# Patient Record
Sex: FEMALE | Race: OTHER | Employment: UNEMPLOYED | URBAN - METROPOLITAN AREA
[De-identification: names, ages, dates, MRNs, and addresses within clinical notes are randomized per-mention and may not be internally consistent; named-entity substitution may affect disease eponyms.]

---

## 2021-10-09 ENCOUNTER — HOSPITAL ENCOUNTER (OUTPATIENT)
Age: 26
Setting detail: OBSERVATION
Discharge: HOME | End: 2021-10-10
Attending: OBSTETRICS & GYNECOLOGY | Admitting: OBSTETRICS & GYNECOLOGY
Payer: COMMERCIAL

## 2021-10-09 LAB
ABO/RH: NORMAL
ALBUMIN SERPL-MCNC: 3.4 G/DL (ref 3.5–5.2)
ALP BLD-CCNC: 96 U/L (ref 35–104)
ALT SERPL-CCNC: 9 U/L (ref 0–32)
AMPHETAMINE SCREEN, URINE: NOT DETECTED
ANION GAP SERPL CALCULATED.3IONS-SCNC: 12 MMOL/L (ref 7–16)
ANTIBODY SCREEN: NORMAL
AST SERPL-CCNC: 12 U/L (ref 0–31)
BARBITURATE SCREEN URINE: NOT DETECTED
BENZODIAZEPINE SCREEN, URINE: NOT DETECTED
BILIRUB SERPL-MCNC: <0.2 MG/DL (ref 0–1.2)
BILIRUBIN URINE: NEGATIVE
BLOOD, URINE: NEGATIVE
BUN BLDV-MCNC: 9 MG/DL (ref 6–20)
CALCIUM SERPL-MCNC: 9.1 MG/DL (ref 8.6–10.2)
CANNABINOID SCREEN URINE: POSITIVE
CHLORIDE BLD-SCNC: 102 MMOL/L (ref 98–107)
CLARITY: CLEAR
CO2: 21 MMOL/L (ref 22–29)
COCAINE METABOLITE SCREEN URINE: NOT DETECTED
COLOR: YELLOW
CREAT SERPL-MCNC: 0.5 MG/DL (ref 0.5–1)
FENTANYL SCREEN, URINE: NOT DETECTED
GFR AFRICAN AMERICAN: >60
GFR NON-AFRICAN AMERICAN: >60 ML/MIN/1.73
GLUCOSE BLD-MCNC: 91 MG/DL (ref 74–99)
GLUCOSE URINE: NEGATIVE MG/DL
HCT VFR BLD CALC: 34.6 % (ref 34–48)
HEMOGLOBIN: 11.4 G/DL (ref 11.5–15.5)
KETONES, URINE: 15 MG/DL
LEUKOCYTE ESTERASE, URINE: NEGATIVE
Lab: ABNORMAL
MCH RBC QN AUTO: 29.6 PG (ref 26–35)
MCHC RBC AUTO-ENTMCNC: 32.9 % (ref 32–34.5)
MCV RBC AUTO: 89.9 FL (ref 80–99.9)
METHADONE SCREEN, URINE: NOT DETECTED
NITRITE, URINE: NEGATIVE
OPIATE SCREEN URINE: NOT DETECTED
OXYCODONE URINE: NOT DETECTED
PDW BLD-RTO: 12.7 FL (ref 11.5–15)
PH UA: 6 (ref 5–9)
PHENCYCLIDINE SCREEN URINE: NOT DETECTED
PLATELET # BLD: 325 E9/L (ref 130–450)
PMV BLD AUTO: 9.5 FL (ref 7–12)
POTASSIUM SERPL-SCNC: 3.5 MMOL/L (ref 3.5–5)
PROTEIN UA: NEGATIVE MG/DL
RBC # BLD: 3.85 E12/L (ref 3.5–5.5)
SODIUM BLD-SCNC: 135 MMOL/L (ref 132–146)
SPECIFIC GRAVITY UA: 1.02 (ref 1–1.03)
TOTAL PROTEIN: 7.2 G/DL (ref 6.4–8.3)
UROBILINOGEN, URINE: 0.2 E.U./DL
WBC # BLD: 18.3 E9/L (ref 4.5–11.5)

## 2021-10-09 PROCEDURE — 85027 COMPLETE CBC AUTOMATED: CPT

## 2021-10-09 PROCEDURE — G0378 HOSPITAL OBSERVATION PER HR: HCPCS

## 2021-10-09 PROCEDURE — 81003 URINALYSIS AUTO W/O SCOPE: CPT

## 2021-10-09 PROCEDURE — G0480 DRUG TEST DEF 1-7 CLASSES: HCPCS

## 2021-10-09 PROCEDURE — 99219 PR INITIAL OBSERVATION CARE/DAY 50 MINUTES: CPT | Performed by: OBSTETRICS & GYNECOLOGY

## 2021-10-09 PROCEDURE — 86900 BLOOD TYPING SEROLOGIC ABO: CPT

## 2021-10-09 PROCEDURE — 2580000003 HC RX 258: Performed by: OBSTETRICS & GYNECOLOGY

## 2021-10-09 PROCEDURE — 86901 BLOOD TYPING SEROLOGIC RH(D): CPT

## 2021-10-09 PROCEDURE — 80307 DRUG TEST PRSMV CHEM ANLYZR: CPT

## 2021-10-09 PROCEDURE — 80053 COMPREHEN METABOLIC PANEL: CPT

## 2021-10-09 PROCEDURE — 36415 COLL VENOUS BLD VENIPUNCTURE: CPT

## 2021-10-09 PROCEDURE — 86850 RBC ANTIBODY SCREEN: CPT

## 2021-10-09 RX ORDER — SODIUM CHLORIDE, SODIUM LACTATE, POTASSIUM CHLORIDE, CALCIUM CHLORIDE 600; 310; 30; 20 MG/100ML; MG/100ML; MG/100ML; MG/100ML
INJECTION, SOLUTION INTRAVENOUS CONTINUOUS
Status: DISCONTINUED | OUTPATIENT
Start: 2021-10-09 | End: 2021-10-10 | Stop reason: HOSPADM

## 2021-10-09 RX ORDER — SODIUM CHLORIDE, SODIUM LACTATE, POTASSIUM CHLORIDE, AND CALCIUM CHLORIDE .6; .31; .03; .02 G/100ML; G/100ML; G/100ML; G/100ML
500 INJECTION, SOLUTION INTRAVENOUS ONCE
Status: COMPLETED | OUTPATIENT
Start: 2021-10-09 | End: 2021-10-09

## 2021-10-09 RX ORDER — ONDANSETRON 2 MG/ML
4 INJECTION INTRAMUSCULAR; INTRAVENOUS EVERY 6 HOURS PRN
Status: DISCONTINUED | OUTPATIENT
Start: 2021-10-09 | End: 2021-10-10 | Stop reason: HOSPADM

## 2021-10-09 RX ORDER — ONDANSETRON 4 MG/1
4 TABLET, FILM COATED ORAL EVERY 8 HOURS PRN
COMMUNITY
End: 2021-10-21

## 2021-10-09 RX ADMIN — SODIUM CHLORIDE, POTASSIUM CHLORIDE, SODIUM LACTATE AND CALCIUM CHLORIDE 999 ML: 600; 310; 30; 20 INJECTION, SOLUTION INTRAVENOUS at 20:51

## 2021-10-09 NOTE — PROGRESS NOTES
, patient states 24 weeks. Just moved from [facility]. C/o vomiting, diarrhea and abdominal pain. States started around 1000 this morning. States vomited once and diarrhea throughout the whole day. History of previous c/section x 3, uterine rupture in 2017, 37 week loss. Patient placed on efm, call light within reach. Information collected via  system.

## 2021-10-10 VITALS
OXYGEN SATURATION: 99 % | DIASTOLIC BLOOD PRESSURE: 59 MMHG | HEART RATE: 90 BPM | RESPIRATION RATE: 16 BRPM | SYSTOLIC BLOOD PRESSURE: 112 MMHG | TEMPERATURE: 97.7 F

## 2021-10-10 PROCEDURE — G0378 HOSPITAL OBSERVATION PER HR: HCPCS

## 2021-10-10 NOTE — H&P
Subjective:      Josue Elias is an 22 y.o. female at 25 and 2/7 weeks gestation presenting with   Chief Complaint   Patient presents with    Diarrhea    Abdominal Pain    Pregnancy Problem   Patient recently arrived from [facility] and has had no prenatal care in the states yet. She brought with her her prenatal records and prenatal labs. Review of this record was performed. History of 3 previous C-sections with uterine rupture at 38 weeks in 2017 with fetal demise. Due to the above-mentioned history, patient presented to labor and delivery for reassurance regarding the pregnancy. Denies any bleeding or fluid leak. Reports good fetal movement. Denies feeling any contractions. Fetal Movement: normal.  History reviewed. No pertinent past medical history. Review of Systems  Pertinent items are noted in HPI. Objective: There were no vitals taken for this visit. There were no vitals taken for this visit. General:   alert, appears stated age and cooperative   Cervix:   Deferred.    FHT:   140 BPM     Lab Review    CBC: No results found for: WBC, RBC, HGB, HCT, MCV, MCH, MCHC, RDW, PLT, MPV  CMP:  No results found for: NA, K, CL, CO2, BUN, CREATININE, GFRAA, AGRATIO, LABGLOM, GLUCOSE, PROT, LABALBU, CALCIUM, BILITOT, ALKPHOS, AST, ALT  U/A:  No results found for: NITRITE, COLORU, PHUR, LABCAST, WBCUA, RBCUA, MUCUS, TRICHOMONAS, YEAST, BACTERIA, CLARITYU, SPECGRAV, LEUKOCYTESUR, UROBILINOGEN, BILIRUBINUR, BLOODU, GLUCOSEU, AMORPHOUS       Assessment:     24 weeks and 2-day pregnancy  3 previous   History of uterine rupture and fetal demise at 38 weeks  Geovani pain, vomiting and diarrhea    Plan:     CBC, CMP, urinalysis  IV fluids  IV Zofran as needed  Supportive therapy    Meri Cedeno MD,MD,10/9/2021 8:27 PM

## 2021-10-10 NOTE — PROGRESS NOTES
Patient given verbal and written discharge instructions with standard labor precautions instructed to keep follow up appointment with physician. Patient verbalizes understanding states no questions or concerns. Patient ambulatory off unit with family member.

## 2021-10-10 NOTE — PROGRESS NOTES
RN at bedside. Patient states she feels good and would like to go home. Denies lof, vb, and ctx's. Good fetal movement. Patients family member at bedside as well. Will update house officer.

## 2021-10-10 NOTE — PROGRESS NOTES
RN at bedside. Patient denies lof, denies vb, denies cramping or pain. States +FM. Patient refuses zofran at this time. Pt denies nausea. Call light within reach.

## 2021-10-10 NOTE — PROGRESS NOTES
Feeling better this morning. Reports good fetal movement. No further diarrhea or abdominal pain    Blood pressure (!) 112/59, pulse 90, temperature 97.7 °F (36.5 °C), resp. rate 16, SpO2 99 %.      Assessment  24 weeks and 3-day pregnancy  3 previous   History of uterine rupture at 45 weeks with fetal demise    Plan  Discharge home  We will schedule appointment for prenatal care at [facility]

## 2021-10-10 NOTE — PROGRESS NOTES
House doctor at beside. Information gathered from patient using  system. Orders received from house doctor at this time.

## 2021-10-11 LAB
INTEGRITY CHECK, CREATININE, URINE: 255.9
INTEGRITY CHECK, OXIDANT, URINE: <40
INTEGRITY CHECK, PH, URINE: 5.6 (ref 4.5–9)
INTEGRITY CHECK, SPECIFIC GRAVITY, URINE: 1.02 (ref 1–1.03)
INTEGRITY CHECK, SPECIMEN INTEGRITY, URINE: ABNORMAL

## 2021-10-18 ENCOUNTER — INITIAL PRENATAL (OUTPATIENT)
Dept: OBGYN | Age: 26
End: 2021-10-18
Payer: COMMERCIAL

## 2021-10-18 ENCOUNTER — CLINICAL DOCUMENTATION (OUTPATIENT)
Dept: OBGYN | Age: 26
End: 2021-10-18

## 2021-10-18 VITALS — HEART RATE: 95 BPM | SYSTOLIC BLOOD PRESSURE: 112 MMHG | WEIGHT: 175 LBS | DIASTOLIC BLOOD PRESSURE: 67 MMHG

## 2021-10-18 DIAGNOSIS — Z3A.25 25 WEEKS GESTATION OF PREGNANCY: Primary | ICD-10-CM

## 2021-10-18 DIAGNOSIS — Z23 NEED FOR INFLUENZA VACCINATION: ICD-10-CM

## 2021-10-18 DIAGNOSIS — Z87.828 HISTORY OF RUPTURE OF UTERUS: ICD-10-CM

## 2021-10-18 LAB
GLUCOSE URINE, POC: NORMAL
PROTEIN UA: NEGATIVE

## 2021-10-18 PROCEDURE — 99203 OFFICE O/P NEW LOW 30 MIN: CPT | Performed by: OBSTETRICS & GYNECOLOGY

## 2021-10-18 PROCEDURE — 6360000002 HC RX W HCPCS

## 2021-10-18 PROCEDURE — 90686 IIV4 VACC NO PRSV 0.5 ML IM: CPT

## 2021-10-18 PROCEDURE — G0008 ADMIN INFLUENZA VIRUS VAC: HCPCS

## 2021-10-18 PROCEDURE — 99212 OFFICE O/P EST SF 10 MIN: CPT | Performed by: OBSTETRICS & GYNECOLOGY

## 2021-10-18 PROCEDURE — 81002 URINALYSIS NONAUTO W/O SCOPE: CPT | Performed by: OBSTETRICS & GYNECOLOGY

## 2021-10-18 RX ORDER — FAMOTIDINE 20 MG/1
20 TABLET, FILM COATED ORAL DAILY
Qty: 60 TABLET | Refills: 3 | Status: SHIPPED | OUTPATIENT
Start: 2021-10-18 | End: 2021-10-21

## 2021-10-18 NOTE — PROGRESS NOTES
SUBJECTIVE:  Pt seen with    32 y.o. Bea Mcgrath female here for routine OB appointment. Here for transfer of care from [facility]. Records reviewed. Labs, pap/cx reviewed. Rubella nonimmune. Hepatitis B, C, varicella testing ordered. 2hr cbc and blood type ordered. MFM consult placed. Pt requests genetic testing. Flu vaccine given. Pt will get second covid vaccine. Pepcid ordered for heartburn. Discussed history of uterine rupture and possible need for earlier c/s-will defer timing to mfm. F/u 4 weeks ob appt and tdap. OB History    Para Term  AB Living   4 2           SAB TAB Ectopic Molar Multiple Live Births                    # Outcome Date GA Lbr Barber/2nd Weight Sex Delivery Anes PTL Lv   4 Current            3             2 Para            1 Para                Past Medical History:   Diagnosis Date    Nausea and vomiting in pregnancy 10/10/2021        Past Surgical History:   Procedure Laterality Date     SECTION          No family history on file.      Social History     Tobacco History     Smoking Status  Never Smoker    Smokeless Tobacco Use  Never Used          Alcohol History     Alcohol Use Status  Not Currently          Drug Use     Drug Use Status  Not Currently          Sexual Activity     Sexually Active  Not Asked                  Current Outpatient Medications:     famotidine (PEPCID) 20 MG tablet, Take 1 tablet by mouth daily, Disp: 60 tablet, Rfl: 3    Prenatal Vit-Fe Fumarate-FA (PRENATAL 1+1 PO), Take by mouth (Patient not taking: Reported on 10/18/2021), Disp: , Rfl:     ondansetron (ZOFRAN) 4 MG tablet, Take 4 mg by mouth every 8 hours as needed for Nausea or Vomiting, Disp: , Rfl:      No Known Allergies     OBJECTIVE:   /67   Pulse 95   Wt 175 lb (79.4 kg)     Mother's Prenatal Vitals  BP: 112/67  Weight: 175 lb (79.4 kg)  Pulse: 95  Patient Position: Sitting  Prenatal Fetal Information  Fetal Heart Rate: 143  Movement: Present      ASSESSMENT:   1. Jay Billy is a 32 y.o. female  2.   3. 25w4d  4. Transfer of care at 25 weeks from [facility]  5. Three previous  sections  6. Uterine rupture at 38 weeks with fetal demise  7. THC abuse  8. Rubella nonimmune  Patient Active Problem List    Diagnosis Date Noted    Nausea and vomiting in pregnancy 10/10/2021    24 weeks gestation of pregnancy 10/09/2021        Diagnosis Orders   1. 25 weeks gestation of pregnancy  CBC    Glucose Tolerance, 2 Hrs    Type and Screen    Ambulatory referral to Maternal Fetal    Culture, Urine    Hepatitis B Surface Antigen    Hepatitis C Antibody    HIV Screen    Varicella Zoster Antibody, IgG    Miscellaneous sendout 3    Cystic fibrosis carrier study    Miscellaneous Sendout 1   2. History of rupture of uterus  Ambulatory referral to Maternal Fetal   3. Need for influenza vaccination  INFLUENZA, QUADV, 3 YRS AND OLDER, IM PF, PREFILL SYR OR SDV, 0.5ML (Holly Beer, PF)       PLAN:     Orders Placed This Encounter   Procedures    Culture, Urine     Standing Status:   Future     Standing Expiration Date:   10/18/2022     Order Specific Question:   Specify (ex-cath, midstream, cysto, etc)?      Answer:   midstream    INFLUENZA, QUADV, 3 YRS AND OLDER, IM PF, PREFILL SYR OR SDV, 0.5ML (AFLURIA QUADV, PF)    CBC     Standing Status:   Future     Standing Expiration Date:   10/18/2022    Glucose Tolerance, 2 Hrs     Standing Status:   Future     Standing Expiration Date:   10/18/2022    Hepatitis B Surface Antigen     Standing Status:   Future     Standing Expiration Date:   10/18/2022    Hepatitis C Antibody     Standing Status:   Future     Standing Expiration Date:   10/18/2022    HIV Screen     Standing Status:   Future     Standing Expiration Date:   10/18/2022    Varicella Zoster Antibody, IgG     Standing Status:   Future     Standing Expiration Date:   10/18/2022    Miscellaneous sendout 3     Standing Status:   Future Standing Expiration Date:   10/18/2022     Order Specific Question:   Specify Req. Test (1 Test/Order)     Answer:   Johnathan Adams Cystic fibrosis carrier study     Prenatal screen     Standing Status:   Future     Standing Expiration Date:   10/18/2022     Order Specific Question:   CF1-CF Symptom? (Y/N/Unknown)     Answer:   No     Order Specific Question:   CF3-Any Family History of CF? (Y/N)     Answer:   No     Order Specific Question:   CF Speciment? Answer:   Blood    Miscellaneous Sendout 1     Standing Status:   Future     Standing Expiration Date:   10/18/2022     Order Specific Question:   Specify Req. Test (1 Test/Order)     Answer:   spinal muscular atrophy carrier screen    Ambulatory referral to Maternal Fetal     Referral Priority:   Routine     Referral Type:   Consult for Advice and Opinion     Referral Reason:   Specialty Services Required     Requested Specialty:   Alternative Medicine     Number of Visits Requested:   1    Type and Screen     Standing Status:   Future     Standing Expiration Date:   10/18/2022      Return in about 4 weeks (around 11/15/2021) for Next prenatal visit.       Electronically signed by Dennis Neff DO on 10/18/21

## 2021-10-18 NOTE — PROGRESS NOTES
Patient is here today for new ob visit. Patient recently moved here. Records were obtained from L&D that patient brought from [place]. Flu shot was ordered and given to patient along with harmony kit and form.

## 2021-10-18 NOTE — PROGRESS NOTES
CHW introduce Patient to Centering Pregnancy .  Patient will Join [facility] 18 Session 6 on 10/28/2021 @ 9:30 AM .

## 2021-10-20 LAB — CANNABINOIDS CONF, URINE: 84 NG/ML

## 2021-10-21 ENCOUNTER — OFFICE VISIT (OUTPATIENT)
Dept: FAMILY MEDICINE CLINIC | Age: 26
End: 2021-10-21
Payer: COMMERCIAL

## 2021-10-21 VITALS
HEIGHT: 61 IN | SYSTOLIC BLOOD PRESSURE: 118 MMHG | BODY MASS INDEX: 34.17 KG/M2 | WEIGHT: 181 LBS | RESPIRATION RATE: 16 BRPM | DIASTOLIC BLOOD PRESSURE: 82 MMHG | OXYGEN SATURATION: 99 % | HEART RATE: 96 BPM | TEMPERATURE: 97.1 F

## 2021-10-21 DIAGNOSIS — O09.92 HIGH-RISK PREGNANCY IN SECOND TRIMESTER: ICD-10-CM

## 2021-10-21 DIAGNOSIS — K21.9 GASTROESOPHAGEAL REFLUX DISEASE, UNSPECIFIED WHETHER ESOPHAGITIS PRESENT: ICD-10-CM

## 2021-10-21 DIAGNOSIS — Z3A.26 26 WEEKS GESTATION OF PREGNANCY: Primary | ICD-10-CM

## 2021-10-21 PROCEDURE — G8482 FLU IMMUNIZE ORDER/ADMIN: HCPCS | Performed by: STUDENT IN AN ORGANIZED HEALTH CARE EDUCATION/TRAINING PROGRAM

## 2021-10-21 PROCEDURE — G8419 CALC BMI OUT NRM PARAM NOF/U: HCPCS | Performed by: STUDENT IN AN ORGANIZED HEALTH CARE EDUCATION/TRAINING PROGRAM

## 2021-10-21 PROCEDURE — 99203 OFFICE O/P NEW LOW 30 MIN: CPT | Performed by: STUDENT IN AN ORGANIZED HEALTH CARE EDUCATION/TRAINING PROGRAM

## 2021-10-21 PROCEDURE — G8427 DOCREV CUR MEDS BY ELIG CLIN: HCPCS | Performed by: STUDENT IN AN ORGANIZED HEALTH CARE EDUCATION/TRAINING PROGRAM

## 2021-10-21 PROCEDURE — 1036F TOBACCO NON-USER: CPT | Performed by: STUDENT IN AN ORGANIZED HEALTH CARE EDUCATION/TRAINING PROGRAM

## 2021-10-21 RX ORDER — FAMOTIDINE 20 MG/1
20 TABLET, FILM COATED ORAL DAILY
Qty: 60 TABLET | Refills: 3 | Status: ON HOLD
Start: 2021-10-21 | End: 2022-01-06 | Stop reason: HOSPADM

## 2021-10-21 RX ORDER — ONDANSETRON 4 MG/1
4 TABLET, FILM COATED ORAL EVERY 8 HOURS PRN
Qty: 30 TABLET | Refills: 0 | Status: ON HOLD
Start: 2021-10-21 | End: 2022-01-06 | Stop reason: HOSPADM

## 2021-10-21 SDOH — ECONOMIC STABILITY: FOOD INSECURITY: WITHIN THE PAST 12 MONTHS, THE FOOD YOU BOUGHT JUST DIDN'T LAST AND YOU DIDN'T HAVE MONEY TO GET MORE.: PATIENT DECLINED

## 2021-10-21 SDOH — ECONOMIC STABILITY: FOOD INSECURITY: WITHIN THE PAST 12 MONTHS, YOU WORRIED THAT YOUR FOOD WOULD RUN OUT BEFORE YOU GOT MONEY TO BUY MORE.: PATIENT DECLINED

## 2021-10-21 ASSESSMENT — ENCOUNTER SYMPTOMS
EYE REDNESS: 0
SORE THROAT: 0
NAUSEA: 0
EYE PAIN: 0
COUGH: 0
DIARRHEA: 0
CONSTIPATION: 0
SHORTNESS OF BREATH: 0
ABDOMINAL PAIN: 0
SINUS PAIN: 0
SINUS PRESSURE: 0
VOMITING: 0
RHINORRHEA: 0
BACK PAIN: 0

## 2021-10-21 ASSESSMENT — PATIENT HEALTH QUESTIONNAIRE - PHQ9
SUM OF ALL RESPONSES TO PHQ QUESTIONS 1-9: 0
DEPRESSION UNABLE TO ASSESS: FUNCTIONAL CAPACITY MOTIVATION LIMITS ACCURACY
1. LITTLE INTEREST OR PLEASURE IN DOING THINGS: 0
SUM OF ALL RESPONSES TO PHQ QUESTIONS 1-9: 0
SUM OF ALL RESPONSES TO PHQ9 QUESTIONS 1 & 2: 0
2. FEELING DOWN, DEPRESSED OR HOPELESS: 0
SUM OF ALL RESPONSES TO PHQ QUESTIONS 1-9: 0

## 2021-10-21 ASSESSMENT — SOCIAL DETERMINANTS OF HEALTH (SDOH): HOW HARD IS IT FOR YOU TO PAY FOR THE VERY BASICS LIKE FOOD, HOUSING, MEDICAL CARE, AND HEATING?: PATIENT DECLINED

## 2021-10-21 NOTE — PROGRESS NOTES
10/21/2021    Imani Garcia (:  1995) is a 32 y.o. female, here for evaluation of the following medical concerns:    HPI  Chief Complaint   Patient presents with    Follow-up     26 weeks pregnant seen in ED for abdominal pain, pain is better no complaints     Patient is a 49-year-old female here today to establish care with myself. She states that her OB told her she needed to follow with me because she is a high risk pregnant patient. However I discussed that I am a primary care doctor and not a high risk pregnancy OB/GYN. We will refer her to who she needs to go to. She has no real past medical history. She had a  in the past and had a uterine rupture. She has no known allergies. HPI:  Geovanna Griggs presents to the office for ER follow up. Patient was seen in the ER for nausea and abdominal pain. Since being discharged from the ER Imani's  symptoms have been Resolved . Any prescribed medications was not taken. Discharge instructions and any medication changes were again reviewed. She declines any symptoms at this time. All her nausea has gone away    Patient denies CP, SOB, nausea, vomiting, diarrhea, fevers chills sweats, abdominal pain, numbness tingling, dizziness, lightheadedness, back pain, ear pain, eye pain, nasal congestion, headaches, blurry vision. Patient's past medical, surgical, social and/or family history reviewed, updated in chart, and are non-contributory (unless otherwise stated). Medications and allergies also reviewed and updated in chart. We did use an  for this examination       Review of Systems   Constitutional: Negative for chills, fatigue and fever. HENT: Negative for congestion, ear pain, postnasal drip, rhinorrhea, sinus pressure, sinus pain and sore throat. Eyes: Negative for pain and redness. Respiratory: Negative for cough and shortness of breath. Cardiovascular: Negative for chest pain.    Gastrointestinal: Negative for abdominal pain, constipation, diarrhea, nausea and vomiting. Genitourinary: Negative for difficulty urinating and dysuria. Musculoskeletal: Negative for back pain, myalgias and neck pain. Skin: Negative for rash. Neurological: Negative for dizziness, light-headedness and numbness. Psychiatric/Behavioral: The patient is not nervous/anxious. Prior to Visit Medications    Medication Sig Taking? Authorizing Provider   ondansetron (ZOFRAN) 4 MG tablet Take 1 tablet by mouth every 8 hours as needed for Nausea or Vomiting Yes Zainab Perkins DO   famotidine (PEPCID) 20 MG tablet Take 1 tablet by mouth daily Yes Zainab Perkins DO        No Known Allergies    Past Medical History:   Diagnosis Date    Nausea and vomiting in pregnancy 10/10/2021       Past Surgical History:   Procedure Laterality Date     SECTION         Social History     Socioeconomic History    Marital status: Single     Spouse name: Not on file    Number of children: Not on file    Years of education: Not on file    Highest education level: Not on file   Occupational History    Not on file   Tobacco Use    Smoking status: Never Smoker    Smokeless tobacco: Never Used   Vaping Use    Vaping Use: Never used   Substance and Sexual Activity    Alcohol use: Not Currently    Drug use: Not Currently    Sexual activity: Not on file   Other Topics Concern    Not on file   Social History Narrative    Not on file     Social Determinants of Health     Financial Resource Strain: Unknown    Difficulty of Paying Living Expenses: Patient refused   Food Insecurity: Unknown    Worried About Running Out of Food in the Last Year: Patient refused    Ran Out of Food in the Last Year: Patient refused   Transportation Needs:     Lack of Transportation (Medical):      Lack of Transportation (Non-Medical):    Physical Activity:     Days of Exercise per Week:     Minutes of Exercise per Session:    Stress:     Feeling of Stress :    Social Connections:     Frequency of Communication with Friends and Family:     Frequency of Social Gatherings with Friends and Family:     Attends Holiness Services:     Active Member of Clubs or Organizations:     Attends Club or Organization Meetings:     Marital Status:    Intimate Partner Violence:     Fear of Current or Ex-Partner:     Emotionally Abused:     Physically Abused:     Sexually Abused:         History reviewed. No pertinent family history. Vitals:    10/21/21 1010   BP: 118/82   Pulse: 96   Resp: 16   Temp: 97.1 °F (36.2 °C)   SpO2: 99%   Weight: 181 lb (82.1 kg)   Height: 5' 1\" (1.549 m)     Estimated body mass index is 34.2 kg/m² as calculated from the following:    Height as of this encounter: 5' 1\" (1.549 m). Weight as of this encounter: 181 lb (82.1 kg).     Most Recent Labs  CBC  Lab Results   Component Value Date    WBC 18.3 10/09/2021    RBC 3.85 10/09/2021    HGB 11.4 10/09/2021    HCT 34.6 10/09/2021    MCV 89.9 10/09/2021     10/09/2021      CMP  Lab Results   Component Value Date     10/09/2021    K 3.5 10/09/2021     10/09/2021    CO2 21 10/09/2021    ANIONGAP 12 10/09/2021    GLUCOSE 91 10/09/2021    BUN 9 10/09/2021    CREATININE 0.5 10/09/2021    LABGLOM >60 10/09/2021    GFRAA >60 10/09/2021    CALCIUM 9.1 10/09/2021    PROT 7.2 10/09/2021    LABALBU 3.4 10/09/2021    BILITOT <0.2 10/09/2021    ALKPHOS 96 10/09/2021    AST 12 10/09/2021    ALT 9 10/09/2021     UA  Lab Results   Component Value Date    COLORU Yellow 10/09/2021    CLARITYU Clear 10/09/2021    GLUCOSEU neg 10/18/2021    GLUCOSEU Negative 10/09/2021    BILIRUBINUR Negative 10/09/2021    KETUA 15 10/09/2021    SPECGRAV 1.025 10/09/2021    BLOODU Negative 10/09/2021    PHUR 6.0 10/09/2021    PROTEINU Negative 10/18/2021    PROTEINU Negative 10/09/2021    UROBILINOGEN 0.2 10/09/2021    NITRU Negative 10/09/2021    LEUKOCYTESUR Negative 10/09/2021       Physical Exam  Vitals and nursing note reviewed. Constitutional:       Appearance: Normal appearance. Comments: Setswana speaking   HENT:      Head: Normocephalic and atraumatic. Right Ear: External ear normal.      Left Ear: External ear normal.      Mouth/Throat:      Mouth: Mucous membranes are moist.      Pharynx: Oropharynx is clear. Eyes:      Extraocular Movements: Extraocular movements intact. Conjunctiva/sclera: Conjunctivae normal.      Pupils: Pupils are equal, round, and reactive to light. Cardiovascular:      Rate and Rhythm: Normal rate and regular rhythm. Pulses: Normal pulses. Heart sounds: Normal heart sounds. Pulmonary:      Effort: Pulmonary effort is normal.      Breath sounds: Normal breath sounds. Abdominal:      General: Bowel sounds are normal.      Palpations: Abdomen is soft. Comments: Patient is pregnant   Musculoskeletal:         General: Normal range of motion. Cervical back: Normal range of motion and neck supple. Skin:     General: Skin is warm and dry. Capillary Refill: Capillary refill takes less than 2 seconds. Neurological:      General: No focal deficit present. Mental Status: She is alert and oriented to person, place, and time. Psychiatric:         Mood and Affect: Mood normal.         Behavior: Behavior normal.         Thought Content: Thought content normal.         ASSESSMENT/PLAN:  Chandrika Ortiz was seen today for follow-up. Diagnoses and all orders for this visit:    26 weeks gestation of pregnancy  -     ondansetron (ZOFRAN) 4 MG tablet; Take 1 tablet by mouth every 8 hours as needed for Nausea or Vomiting    Gastroesophageal reflux disease, unspecified whether esophagitis present  -     famotidine (PEPCID) 20 MG tablet;  Take 1 tablet by mouth daily    High-risk pregnancy in second trimester  -     External Referral To OB/GYN           Educational materials and/or home exercises printed for patient's review and were included in patient instructions on his/her After Visit Summary and given to patient at the end of visit. Counseled regarding above diagnosis, including possible risks and complications,  especially if left uncontrolled. Counseled regarding the possible side effects, risks, benefits and alternatives to treatment; patient and/or guardian verbalizes understanding, agrees, feels comfortable with and wishes to proceed with above treatment plan. Advised patient to call with any new medication issues, and read all Rx info from pharmacy to assure aware of all possible risks and side effects of medication before taking. Reviewed age and gender appropriate health screening exams and vaccinations. Advised patient regarding importance of keeping up with recommended health maintenance and to schedule as soon as possible if overdue, as this is important in assessing for undiagnosed pathology, especially cancer, as well as protecting against potentially harmful/life threatening disease. Patient and/or guardian verbalizes understanding and agrees with above counseling, assessment and plan. All questions answered. Return in about 3 months (around 1/21/2022). An  electronic signature was used to authenticate this note.     --Vipin Pimentel DO on 10/21/2021 at 10:38 AM

## 2021-10-26 ENCOUNTER — ANCILLARY PROCEDURE (OUTPATIENT)
Dept: OBGYN CLINIC | Age: 26
End: 2021-10-26
Payer: COMMERCIAL

## 2021-10-26 ENCOUNTER — INITIAL PRENATAL (OUTPATIENT)
Dept: OBGYN CLINIC | Age: 26
End: 2021-10-26
Payer: COMMERCIAL

## 2021-10-26 VITALS
SYSTOLIC BLOOD PRESSURE: 105 MMHG | DIASTOLIC BLOOD PRESSURE: 71 MMHG | HEART RATE: 99 BPM | WEIGHT: 178 LBS | BODY MASS INDEX: 33.63 KG/M2

## 2021-10-26 DIAGNOSIS — Z3A.26 26 WEEKS GESTATION OF PREGNANCY: Primary | ICD-10-CM

## 2021-10-26 LAB
GLUCOSE URINE, POC: NEGATIVE
PROTEIN UA: NEGATIVE

## 2021-10-26 PROCEDURE — G8427 DOCREV CUR MEDS BY ELIG CLIN: HCPCS | Performed by: OBSTETRICS & GYNECOLOGY

## 2021-10-26 PROCEDURE — G8482 FLU IMMUNIZE ORDER/ADMIN: HCPCS | Performed by: OBSTETRICS & GYNECOLOGY

## 2021-10-26 PROCEDURE — 81002 URINALYSIS NONAUTO W/O SCOPE: CPT | Performed by: OBSTETRICS & GYNECOLOGY

## 2021-10-26 PROCEDURE — G8419 CALC BMI OUT NRM PARAM NOF/U: HCPCS | Performed by: OBSTETRICS & GYNECOLOGY

## 2021-10-26 PROCEDURE — 99203 OFFICE O/P NEW LOW 30 MIN: CPT | Performed by: OBSTETRICS & GYNECOLOGY

## 2021-10-26 PROCEDURE — 99202 OFFICE O/P NEW SF 15 MIN: CPT | Performed by: OBSTETRICS & GYNECOLOGY

## 2021-10-26 PROCEDURE — 76811 OB US DETAILED SNGL FETUS: CPT | Performed by: OBSTETRICS & GYNECOLOGY

## 2021-10-26 PROCEDURE — 1036F TOBACCO NON-USER: CPT | Performed by: OBSTETRICS & GYNECOLOGY

## 2021-10-26 NOTE — PATIENT INSTRUCTIONS
Patient Education        Quillian Speller a hardy médico yusuf el [address] (después de 20 semanas)  Learning About When to Call Your Doctor During Pregnancy (After 20 Weeks)  Instrucciones de cuidado  Es normal que tenga inquietudes acerca de lo que podría ser un problema yusuf el [address]. Aunque la mayoría de las mujeres embarazadas no tienen ningún problema grave, es importante saber cuándo llamar a hardy médico si tiene determinados síntomas o señales de trabajo de [place]. Estas son algunas sugerencias generales. Hardy médico puede darle más información sobre cuándo llamar. Cuándo llamar a hardy médico (después de 20 semanas)  Llame al 911  en cualquier momento que considere que necesita atención de [place]. Por ejemplo, llame si:  · Tiene sangrado vaginal intenso. · Tiene dolor repentino e intenso en el abdomen. · Se desmayó (perdió el conocimiento). · Tiene oswaldo convulsión. · Ve o siente el cordón umbilical.  · Cherry que está a punto de coty a conchis a hardy bebé y no puede llegar en forma gandara al hospital.  Vishal Eaves a hardy médico ahora mismo o busque atención médica inmediata si:  · Tiene sangrado vaginal.  · Tiene dolor en el abdomen. · Tiene fiebre. · Tiene síntomas de preeclampsia, jose francisco:  ? Hinchazón repentina de la emil, las yulissa o los pies. ? Nuevos problemas de visión (jose francisco oscurecimiento, dianna borroso o dianna puntos). ? Dolor de luan intenso. · Tiene oswaldo pérdida repentina de líquido por la vagina. (Piensa que rompió la jerald). · Piensa que puede luther comenzado el [place] de [place]. [place] significa que ha tenido al menos 6 contracciones en [employer]. · Nota que hardy bebé ha dejado de moverse o lo hace mucho menos de lo habitual.  · Tiene síntomas de oswaldo infección urinaria. Estos pueden incluir:  ? Dolor o ardor al orinar. ? Necesidad de orinar con frecuencia sin poder eliminar mucha orina.   ? Dolor en el flanco, que se encuentra felipe debajo de la caja torácica y [place] de la cintura en un lado de la espalda. ? [employer]. Preste especial atención a los cambios en hardy alberto y asegúrese de comunicarse con hardy médico si:  · Tiene flujo vaginal con un olor desagradable. · Tiene cambios en la piel, tales jose francisco:  ? Salpullido. ? Comezón. ? Color amarillento en la piel. · Tiene otras inquietudes acerca de hardy embarazo. Si tiene signos de trabajo de parto al llegar a las [address] o más  Si tiene señales de [place] de parto a las 37 semanas o [place], es posible que hardy médico le diga que llame cuando hardy trabajo de parto se vuelva más [place]. Los síntomas del trabajo de parto activo incluyen:  · Contracciones que son regulares. · Contracciones a intervalos de menos de 5 minutos. · Contracciones yusuf las cuales es difícil hablar. La atención de seguimiento es oswaldo parte clave de hardy tratamiento y seguridad. Asegúrese de hacer y acudir a todas las citas, y llame a hardy médico si está teniendo problemas. También es oswaldo buena idea saber los resultados de azucena exámenes y mantener oswaldo lista de los medicamentos que emilio. ¿Dónde puede encontrar más información en inglés? Briana Ricardo a [email or web address] e ingrese a hardy cuenta de MyChart. Khalif Borjao S565 en el Lizette Barth \"Search Health Information\" para más información (en inglés) sobre \"Aprenda cuándo llamar a hardy médico yusuf el embarazo (después de 20 semanas). \"     Si no tiene oswaldo cuenta, ten heidy en el enlace \"Sign Up Now\". Revisado: 16 junio, 2021               Versión del contenido: 13.0  © 2368-4445 Healthwise, Incorporated. Las instrucciones de cuidado fueron adaptadas bajo licencia por [facility] ([facility]). Si usted tiene [place] afección médica o sobre estas instrucciones, siempre pregunte a hardy profesional de alberto. Healthwise, Incorporated niega toda garantía o responsabilidad por hardy uso de esta información.          Patient Education        Ginette Bending a hardy médico yusuf el embarazo (después de 20 semanas)  Learning About When to Call Your Doctor During Pregnancy (After 20 Weeks)  Instrucciones de cuidado  Es normal que tenga inquietudes acerca de lo que podría ser un problema yusuf el [place]. Aunque la mayoría de las mujeres embarazadas no tienen ningún problema grave, es importante saber cuándo llamar a hardy médico si tiene determinados síntomas o señales de trabajo de [place]. Estas son algunas sugerencias generales. Hardy médico puede darle más información sobre cuándo llamar. Cuándo llamar a hardy médico (después de 20 semanas)  Llame al 911  en cualquier momento que considere que necesita atención de [place]. Por ejemplo, llame si:  · Tiene sangrado vaginal intenso. · Tiene dolor repentino e intenso en el abdomen. · Se desmayó (perdió el conocimiento). · Tiene oswaldo convulsión. · Ve o siente el cordón umbilical.  · Cherry que está a punto de coty a conchis a hardy bebé y no puede llegar en forma gandara al hospital.  Jennifern Mario a hardy médico ahora mismo o busque atención médica inmediata si:  · Tiene sangrado vaginal.  · Tiene dolor en el abdomen. · Tiene fiebre. · Tiene síntomas de preeclampsia, jose francisco:  ? Hinchazón repentina de la emil, las yulissa o los pies. ? Nuevos problemas de visión (jose francisco oscurecimiento, dianna borroso o dianna puntos). ? Dolor de luan intenso. · Tiene oswaldo pérdida repentina de líquido por la vagina. (Piensa que rompió la jerald). · Piensa que puede luther comenzado el [place] de [place]. [place] significa que ha tenido al menos 6 contracciones en [employer]. · Nota que hardy bebé ha dejado de moverse o lo hace mucho menos de lo habitual.  · Tiene síntomas de oswaldo infección urinaria. Estos pueden incluir:  ? Dolor o ardor al orinar. ? Necesidad de orinar con frecuencia sin poder eliminar mucha orina. ? Dolor en el flanco, que se encuentra felipe debajo de la caja torácica y [place] de la cintura en un lado de la espalda. ? [employer].   Preste especial atención a los cambios en hardy alberto y asegúrese de comunicarse con hardy médico si:  · Tiene flujo vaginal con un olor desagradable. · Tiene cambios en la piel, tales jose francisco:  ? Salpullido. ? Comezón. ? Color amarillento en la piel. · Tiene otras inquietudes acerca de hardy embarazo. Si tiene signos de trabajo de parto al llegar a las 37 [address] [facility] o más  Si tiene señales de [place] de parto a las 37 semanas o [facility], es posible que hardy médico le diga que llame cuando hardy trabajo de parto se vuelva más [place]. Los síntomas del trabajo de parto activo incluyen:  · Contracciones que son regulares. · Contracciones a intervalos de menos de 5 minutos. · Contracciones yusuf las cuales es difícil hablar. La atención de seguimiento es oswaldo parte clave de hardy tratamiento y seguridad. Asegúrese de hacer y acudir a todas las citas, y llame a hardy médico si está teniendo problemas. También es oswaldo buena idea saber los resultados de azucena exámenes y mantener oswaldo lista de los medicamentos que emilio. ¿Dónde puede encontrar más información en inglés? Priyanka Parent a [email or web address] e ingrese a hardy cuenta de Christin Solorzano O564 en el Amarilis Mukherjee \"Search Health Information\" para más información (en inglés) sobre \"Aprenda cuándo llamar a hardy médico yusuf el embarazo (después de 20 semanas). \"     Si no tiene oswaldo cuenta, ten heidy en el enlace \"Sign Up Now\". Revisado: 16 junio, 2021               Versión del contenido: 13.0  © 8217-4650 Healthwise, Incorporated. Las instrucciones de cuidado fueron adaptadas bajo licencia por [facility] ([facility]). Si usted tiene [place] afección médica o sobre estas instrucciones, siempre pregunte a hardy profesional de alberto. [facility] niega toda garantía o responsabilidad por hardy uso de esta información.          Patient Education        Coleen Schultz a hardy Christia Girt (después de 20 semanas)  Learning About When to Call Your Doctor During Pregnancy (After 20 Weeks)  Instrucciones de cuidado  Es normal que tenga inquietudes acerca de lo que podría ser un problema yusuf [place]. Aunque la mayoría de las mujeres embarazadas no tienen ningún problema grave, es importante saber cuándo llamar a hardy médico si tiene determinados síntomas o señales de trabajo de [place]. Estas son algunas sugerencias generales. Hardy médico puede darle más información sobre cuándo llamar. Cuándo llamar a hardy médico (después de 20 semanas)  Llame al 911  en cualquier momento que considere que necesita atención de [place]. Por ejemplo, llame si:  · Tiene sangrado vaginal intenso. · Tiene dolor repentino e intenso en el abdomen. · Se desmayó (perdió el conocimiento). · Tiene oswaldo convulsión. · Ve o siente el cordón umbilical.  · Cherry que está a punto de coty a conchis a hardy bebé y no puede llegar en forma gandara al hospital.  Kathi La Plata a hardy médico ahora mismo o busque atención médica inmediata si:  · Tiene sangrado vaginal.  · Tiene dolor en el abdomen. · Tiene fiebre. · Tiene síntomas de preeclampsia, jose francisco:  ? Hinchazón repentina de la emil, las yulissa o los pies. ? Nuevos problemas de visión (jose francisco oscurecimiento, dianna borroso o dianna puntos). ? Dolor de luan intenso. · Tiene oswaldo pérdida repentina de líquido por la vagina. (Piensa que rompió la jerald). · Piensa que puede luther comenzado el [place] de [place]. [place] significa que ha tenido al menos 6 contracciones en [employer]. · Nota que hardy bebé ha dejado de moverse o lo hace mucho menos de lo habitual.  · Tiene síntomas de oswaldo infección urinaria. Estos pueden incluir:  ? Dolor o ardor al orinar. ? Necesidad de orinar con frecuencia sin poder eliminar mucha orina. ? Dolor en el flanco, que se encuentra felipe debajo de la caja torácica y [place] de la cintura en un lado de la espalda. ? [employer]. Preste especial atención a los cambios en hardy alberto y asegúrese de comunicarse con hardy médico si:  · Tiene flujo vaginal con un olor desagradable. · Tiene cambios en la piel, tales jose francisco:  ? Salpullido. ? Comezón.   ? Color amarillento en la piel. · Tiene otras inquietudes acerca de hardy embarazo. Si tiene signos de trabajo de parto al llegar a las 37 [address] [facility] o más  Si tiene señales de [place] de parto a las 37 semanas o [place], es posible que hardy médico le diga que llame cuando hardy trabajo de parto se vuelva más [place]. Los síntomas del trabajo de parto activo incluyen:  · Contracciones que son regulares. · Contracciones a intervalos de menos de 5 minutos. · Contracciones yusuf las cuales es difícil hablar. La atención de seguimiento es oswaldo parte clave de hardy tratamiento y seguridad. Asegúrese de hacer y acudir a todas las citas, y llame a hardy médico si está teniendo problemas. También es oswaldo buena idea saber los resultados de azucena exámenes y mantener oswaldo lista de los medicamentos que emilio. ¿Dónde puede encontrar más información en inglés? Jo Frost a [email or web address] e ingrese a hardy cuenta de MyChart. Ace Popper G938 en el Ellen Salem \"Search Health Information\" para más información (en inglés) sobre \"Aprenda cuándo llamar a hardy médico yusuf el embarazo (después de 20 semanas). \"     Si no tiene oswaldo cuenta, ten heidy en el enlace \"Sign Up Now\". Revisado: 16 junio, 2021               Versión del contenido: 13.0  © 8716-9145 Healthwise, Incorporated. Las instrucciones de cuidado fueron adaptadas bajo licencia por [facility] ([facility]). Si usted tiene [place] afección médica o sobre estas instrucciones, siempre pregunte a hardy profesional de alberto. Healthwise, Incorporated niega toda garantía o responsabilidad por hardy uso de esta información.

## 2021-10-26 NOTE — LETTER
[facility] Maternal Fetal Medicine  [address] [place] [address]  Phone: [phone]  Fax: [phone]           Eldon Rojas MD      October 28, 2021     Patient: Chevy Kapoor   MR Number: [id number]   YOB: 1995   Date of Visit: 10/26/2021       Dear Dr. Cayden Putnam: Thank you for referring Chevy Kapoor to me for evaluation/treatment. Below are the relevant portions of my assessment and plan of care. If you have questions, please do not hesitate to call me. I look forward to following Imani along with you.     Sincerely,        Eldon Rojas MD    CC providers:  Violeta Nowak DO  [address] [place] [id number]  Via In [facility] [phone]

## 2021-10-28 ENCOUNTER — ROUTINE PRENATAL (OUTPATIENT)
Dept: OBGYN | Age: 26
End: 2021-10-28
Payer: COMMERCIAL

## 2021-10-28 VITALS
DIASTOLIC BLOOD PRESSURE: 72 MMHG | BODY MASS INDEX: 33.87 KG/M2 | HEART RATE: 109 BPM | WEIGHT: 179.25 LBS | SYSTOLIC BLOOD PRESSURE: 111 MMHG

## 2021-10-28 DIAGNOSIS — Z34.93 PRENATAL CARE IN THIRD TRIMESTER: Primary | ICD-10-CM

## 2021-10-28 LAB
GLUCOSE URINE, POC: NEGATIVE
PROTEIN UA: NEGATIVE

## 2021-10-28 PROCEDURE — G8482 FLU IMMUNIZE ORDER/ADMIN: HCPCS | Performed by: MIDWIFE

## 2021-10-28 PROCEDURE — 99212 OFFICE O/P EST SF 10 MIN: CPT | Performed by: MIDWIFE

## 2021-10-28 PROCEDURE — 81002 URINALYSIS NONAUTO W/O SCOPE: CPT | Performed by: MIDWIFE

## 2021-10-28 PROCEDURE — G8427 DOCREV CUR MEDS BY ELIG CLIN: HCPCS | Performed by: MIDWIFE

## 2021-10-28 PROCEDURE — 99213 OFFICE O/P EST LOW 20 MIN: CPT | Performed by: MIDWIFE

## 2021-10-28 PROCEDURE — 1036F TOBACCO NON-USER: CPT | Performed by: MIDWIFE

## 2021-10-28 PROCEDURE — G8419 CALC BMI OUT NRM PARAM NOF/U: HCPCS | Performed by: MIDWIFE

## 2021-10-28 NOTE — PROGRESS NOTES
evaluation was done today. Please refer to the enclosed copy of the ultrasound report for further detailed information. ULTRASOUND IMPRESSION:    ? US is not diagnostic for fetal aneuploidy and may not detect all structural defects even if multiple exams are performed. ? Normal ultrasound findings cannot guarantee normal pregnancy outcomes. ? Within developmental and technical limits of ultrasound assessment,   ? Vertex Female fetus @  26w 5d   ? Active, responsive baby. The amniotic fluid volume appears normal.   ? The fetus is measuring appropriate for gestational age. - 943g ( 2:1)  48%ile   ? There has been good interval growth and development . ? Fetal anatomy was reviewed and appears normal.  ? Transabdominal views of cervix appears to be closed, long, without significant funneling upon Valsalva. ? Placenta is big, posterior/ fundal with lateral extension , Grade 1, intact with central insertion 3VC.   - No overlying vessels near leading edge. ( no previa)  - OEWN/ prior C/S scars  intact  ? Soft markers for aneuploidy are examined and found to be favorable. - ~Many of the important findings cannot be fully assessed at this gestational age  ? She noted fetal movement, no cramping or contractions . ? RECOMMENDATIONS:  ? Per ACOG Committee Opinion 853 ( 2021) Medically Indicated Late- and Early Term Deliveries  -  ? Recommend best window of opportunity for  repeat  with history of uterine rupture  is   36-36w   ? Sooner for clinical indices - bleeding, labor, fetal problems etc.  ? No   ? Precautionary steroids @ 34w / sooner PRN  1. Second  Trimester  concerns and precautions reviewed with patient. Discussed fetal movements and the role of  testing to help optimize growth and development and help predict/ avoid stress. Discussed trouble signs to watch for. 2. The patient is to continue to follow with you in your office for ongoing obstetric care.   3. Followup visit in  4 weeks . 4. MFM Appointment has  been scheduled  11/17/2021  5. I advised the patient that the [employer] of Obstetrics and Gynecology and The Society for Maternal Fetal Medicine recommend that all pregnant women be vaccinated for COVID-19. Information provided   6. Further evaluation and management will be dependent on her clinical presentation and the results of her testing. Once again, thank you for allowing us to participate in the care of this patient and if we can be of any further assistance to you, please do not hesitate to contact us. Sincerely,      Azul Cronin MD  I was present during her visit , supervised the ultrasound examination and provided the patient evaluation and management. The total time in minutes spent regarding the patient today, reviewing medical records, reviewing imaging studies, performing ultrasonic imaging, reviewing laboratory testing, and documenting information was 16 minutes, of which, 50% of the time was spent in patient education, counseling, and coordinating care with the patient, her provider, and/or her family. I answered all of her questions to her satisfaction.  I asked her to call if she had any additional questions prior to her next visit      ^^^ Intact OWEN      Placenta clear of scar, no previa

## 2021-10-28 NOTE — PROGRESS NOTES
Imani Cantor is a 32 y.o. female 30w0d  Centering Pregnancy Visit    [id number]    OB History    Para Term  AB Living   4 3 3   0 2   SAB TAB Ectopic Molar Multiple Live Births   0         2      # Outcome Date GA Lbr Barber/2nd Weight Sex Delivery Anes PTL Lv   4 Current            3 Term 2017 38w0d    CS-LTranv   FD      Birth Comments: uterine rupture, still birth    2 Term 16 39w0d  7 lb 14 oz (3.572 kg) F CS-LTranv  Y MONA      Birth Comments: meconium aspiration   1 Term 04/15/13 39w0d  8 lb (3.629 kg) M CS-LTranv   MONA      Birth Comments: meconium aspiration         Mother's Prenatal Vitals  BP: 111/72  Weight: 179 lb 4 oz (81.3 kg)  Pulse: 109  Patient Position: Sitting  Prenatal Fetal Information  Fundal Height (cm): 29 cm  Movement: Present      The patient was seen and evaluated. There was positive fetal movements. No contractions or leakage of fluid. Signs and symptoms of  labor as well as labor were reviewed. The S/S of Pre-Eclampsia were reviewed with the patient in detail. She is to report any of these if they occur. She currently denies any of these. The patient had her 28 week labs ordered. T-Dap Vaccine (27-36 weeks): today, had flu vaccine    Assessment:  1. Imani Cantor is a 32 y.o. female  2. [id number]  3. 27w0d    Patient Active Problem List    Diagnosis Date Noted    Nausea and vomiting in pregnancy 10/10/2021    24 weeks gestation of pregnancy 10/09/2021        Diagnosis Orders   1. Prenatal care in third trimester  POCT urine qual dipstick protein    POCT urine qual dipstick glucose             Plan:  The patient will return to the office for her next visit in 2 weeks.     FABIAN Zepeda CNM

## 2021-11-04 ENCOUNTER — HOSPITAL ENCOUNTER (OUTPATIENT)
Age: 26
Discharge: HOME | End: 2021-11-04
Payer: MEDICAID

## 2021-11-04 DIAGNOSIS — Z3A.25 25 WEEKS GESTATION OF PREGNANCY: ICD-10-CM

## 2021-11-04 LAB
ABO/RH: NORMAL
ANTIBODY SCREEN: NORMAL
GLUCOSE TOLERANCE TEST 1 HOUR: 151 MG/DL
GLUCOSE TOLERANCE TEST 2 HOUR: 95 MG/DL
GLUCOSE TOLERANCE TEST FASTING: 80 MG/DL
HCT VFR BLD CALC: 33.3 % (ref 34–48)
HEMOGLOBIN: 10.7 G/DL (ref 11.5–15.5)
Lab: NORMAL
MCH RBC QN AUTO: 28.8 PG (ref 26–35)
MCHC RBC AUTO-ENTMCNC: 32.1 % (ref 32–34.5)
MCV RBC AUTO: 89.8 FL (ref 80–99.9)
PDW BLD-RTO: 12.9 FL (ref 11.5–15)
PLATELET # BLD: 312 E9/L (ref 130–450)
PMV BLD AUTO: 10.2 FL (ref 7–12)
RBC # BLD: 3.71 E12/L (ref 3.5–5.5)
REPORT: NORMAL
THIS TEST SENT TO: NORMAL
WBC # BLD: 11.5 E9/L (ref 4.5–11.5)

## 2021-11-04 PROCEDURE — 86787 VARICELLA-ZOSTER ANTIBODY: CPT

## 2021-11-04 PROCEDURE — 86901 BLOOD TYPING SEROLOGIC RH(D): CPT

## 2021-11-04 PROCEDURE — 81220 CFTR GENE COM VARIANTS: CPT

## 2021-11-04 PROCEDURE — 86803 HEPATITIS C AB TEST: CPT

## 2021-11-04 PROCEDURE — 36415 COLL VENOUS BLD VENIPUNCTURE: CPT

## 2021-11-04 PROCEDURE — 87340 HEPATITIS B SURFACE AG IA: CPT

## 2021-11-04 PROCEDURE — 82951 GLUCOSE TOLERANCE TEST (GTT): CPT

## 2021-11-04 PROCEDURE — 81329 SMN1 GENE DOS/DELETION ALYS: CPT

## 2021-11-04 PROCEDURE — 86703 HIV-1/HIV-2 1 RESULT ANTBDY: CPT

## 2021-11-04 PROCEDURE — 86900 BLOOD TYPING SEROLOGIC ABO: CPT

## 2021-11-04 PROCEDURE — 85027 COMPLETE CBC AUTOMATED: CPT

## 2021-11-04 PROCEDURE — 86850 RBC ANTIBODY SCREEN: CPT

## 2021-11-05 LAB
HEPATITIS B SURFACE ANTIGEN INTERPRETATION: NORMAL
HEPATITIS C ANTIBODY INTERPRETATION: NORMAL
HIV-1 AND HIV-2 ANTIBODIES: NORMAL

## 2021-11-08 LAB — VARICELLA-ZOSTER VIRUS AB, IGG: NORMAL

## 2021-11-11 ENCOUNTER — CLINICAL DOCUMENTATION (OUTPATIENT)
Dept: OBGYN | Age: 26
End: 2021-11-11

## 2021-11-11 ENCOUNTER — ROUTINE PRENATAL (OUTPATIENT)
Dept: OBGYN | Age: 26
End: 2021-11-11
Payer: MEDICAID

## 2021-11-11 VITALS
BODY MASS INDEX: 34.05 KG/M2 | SYSTOLIC BLOOD PRESSURE: 93 MMHG | DIASTOLIC BLOOD PRESSURE: 58 MMHG | HEART RATE: 114 BPM | WEIGHT: 180.2 LBS

## 2021-11-11 DIAGNOSIS — Z34.93 PRENATAL CARE IN THIRD TRIMESTER: Primary | ICD-10-CM

## 2021-11-11 LAB
GLUCOSE URINE, POC: NEGATIVE
PROTEIN UA: NEGATIVE

## 2021-11-11 PROCEDURE — 99213 OFFICE O/P EST LOW 20 MIN: CPT | Performed by: MIDWIFE

## 2021-11-11 PROCEDURE — 1036F TOBACCO NON-USER: CPT | Performed by: MIDWIFE

## 2021-11-11 PROCEDURE — G8482 FLU IMMUNIZE ORDER/ADMIN: HCPCS | Performed by: MIDWIFE

## 2021-11-11 PROCEDURE — 81002 URINALYSIS NONAUTO W/O SCOPE: CPT | Performed by: MIDWIFE

## 2021-11-11 PROCEDURE — G8419 CALC BMI OUT NRM PARAM NOF/U: HCPCS | Performed by: MIDWIFE

## 2021-11-11 PROCEDURE — 99212 OFFICE O/P EST SF 10 MIN: CPT | Performed by: MIDWIFE

## 2021-11-11 PROCEDURE — G8428 CUR MEDS NOT DOCUMENT: HCPCS | Performed by: MIDWIFE

## 2021-11-11 NOTE — PROGRESS NOTES
Patient participated in [facility] 18 session 7  Today topic: Caring for your baby, Planning Pediatric care, and car seat safety. Patient received; Car seat, Diapers size 4 and baby wipes. Patient will contact [place] staff with any questions or concerns.

## 2021-11-11 NOTE — PROGRESS NOTES
Timmy Hernandez is a 32 y.o. female 32w0d  Centering Pregnancy visit     [id number]    OB History    Para Term  AB Living   4 3 3   0 2   SAB IAB Ectopic Molar Multiple Live Births   0         2      # Outcome Date GA Lbr Barber/2nd Weight Sex Delivery Anes PTL Lv   4 Current            3 Term 2017 38w0d    CS-LTranv   FD      Birth Comments: uterine rupture, still birth    2 Term 16 39w0d  7 lb 14 oz (3.572 kg) F CS-LTranv  Y MONA      Birth Comments: meconium aspiration   1 Term 04/15/13 39w0d  8 lb (3.629 kg) M CS-LTranv   MONA      Birth Comments: meconium aspiration         Mother's Prenatal Vitals  BP: (!) 93/58  Weight: 180 lb 3.2 oz (81.7 kg)  Pulse: 114  Patient Position: Sitting  Alb/Glu  Albumin: Negative  Glucose: Negative      The patient was seen and evaluated. There was positive fetal movements. No contractions or leakage of fluid. Signs and symptoms of  labor as well as labor were reviewed. The S/S of Pre-Eclampsia were reviewed with the patient in detail. She is to report any of these if they occur. She currently denies any of these. The patient had her 28 week labs completed. Assessment:  1. Timmy Hernandez is a 32 y.o. female  2. [id number]  3. 29w0d    Patient Active Problem List    Diagnosis Date Noted    Nausea and vomiting in pregnancy 10/10/2021    24 weeks gestation of pregnancy 10/09/2021        Diagnosis Orders   1. Prenatal care in third trimester  POCT urine qual dipstick glucose    POCT urine qual dipstick protein             Plan:  The patient will return to the office for her next visit in 2 weeks.      FABIAN Salas CNM

## 2021-11-17 ENCOUNTER — ROUTINE PRENATAL (OUTPATIENT)
Dept: OBGYN | Age: 26
End: 2021-11-17
Payer: MEDICAID

## 2021-11-17 VITALS
WEIGHT: 183.2 LBS | SYSTOLIC BLOOD PRESSURE: 99 MMHG | BODY MASS INDEX: 34.62 KG/M2 | HEART RATE: 101 BPM | DIASTOLIC BLOOD PRESSURE: 68 MMHG

## 2021-11-17 DIAGNOSIS — Z34.93 PRENATAL CARE IN THIRD TRIMESTER: Primary | ICD-10-CM

## 2021-11-17 DIAGNOSIS — Z3A.29 29 WEEKS GESTATION OF PREGNANCY: ICD-10-CM

## 2021-11-17 LAB
GLUCOSE URINE, POC: NORMAL
PROTEIN UA: NEGATIVE

## 2021-11-17 PROCEDURE — 81002 URINALYSIS NONAUTO W/O SCOPE: CPT | Performed by: OBSTETRICS & GYNECOLOGY

## 2021-11-17 PROCEDURE — 99213 OFFICE O/P EST LOW 20 MIN: CPT | Performed by: OBSTETRICS & GYNECOLOGY

## 2021-11-17 PROCEDURE — 6360000002 HC RX W HCPCS

## 2021-11-17 PROCEDURE — 90700 DTAP VACCINE < 7 YRS IM: CPT

## 2021-11-17 PROCEDURE — 90471 IMMUNIZATION ADMIN: CPT

## 2021-11-17 PROCEDURE — 99212 OFFICE O/P EST SF 10 MIN: CPT | Performed by: OBSTETRICS & GYNECOLOGY

## 2021-11-17 RX ORDER — VITAMIN A ACETATE, BETA CAROTENE, ASCORBIC ACID, CHOLECALCIFEROL, .ALPHA.-TOCOPHEROL ACETATE, DL-, THIAMINE MONONITRATE, RIBOFLAVIN, NIACINAMIDE, PYRIDOXINE HYDROCHLORIDE, FOLIC ACID, CYANOCOBALAMIN, CALCIUM CARBONATE, FERROUS FUMARATE, ZINC OXIDE, CUPRIC OXIDE 3080; 12; 120; 400; 1; 1.84; 3; 20; 22; 920; 25; 200; 27; 10; 2 [IU]/1; UG/1; MG/1; [IU]/1; MG/1; MG/1; MG/1; MG/1; MG/1; [IU]/1; MG/1; MG/1; MG/1; MG/1; MG/1
1 TABLET, FILM COATED ORAL DAILY
Qty: 90 TABLET | Refills: 3 | Status: SHIPPED | OUTPATIENT
Start: 2021-11-17 | End: 2022-02-28

## 2021-11-17 NOTE — PROGRESS NOTES
Pt is here today for prenatal visit at 29w6d. Pt needs Rx for Prenatals sent to her pharmacy. She had been using OTC prenatals but ran out. Pt states that her repeat CS is scheduled for December but no surgery encounter listed in her chart at this time, please confirm delivery date. Baby is active per pt. Denies bleeding, leaking of fluids, contractions. Plan of care discussed with pt by provider.

## 2021-11-23 ENCOUNTER — ROUTINE PRENATAL (OUTPATIENT)
Dept: OBGYN CLINIC | Age: 26
End: 2021-11-23
Payer: MEDICAID

## 2021-11-23 ENCOUNTER — ANCILLARY PROCEDURE (OUTPATIENT)
Dept: OBGYN CLINIC | Age: 26
End: 2021-11-23
Payer: MEDICAID

## 2021-11-23 VITALS
WEIGHT: 180.5 LBS | DIASTOLIC BLOOD PRESSURE: 67 MMHG | SYSTOLIC BLOOD PRESSURE: 97 MMHG | HEART RATE: 90 BPM | BODY MASS INDEX: 34.11 KG/M2

## 2021-11-23 DIAGNOSIS — Z3A.30 PREGNANCY WITH 30 COMPLETED WEEKS GESTATION: Primary | ICD-10-CM

## 2021-11-23 DIAGNOSIS — O21.9 NAUSEA AND VOMITING IN PREGNANCY: ICD-10-CM

## 2021-11-23 LAB
CYSTIC FIBROSIS 165 VARIANTS INTERP: NORMAL
CYSTIC FIBROSIS 5T VARIANT: NORMAL
CYSTIC FIBROSIS ALLELE 1: NEGATIVE
CYSTIC FIBROSIS ALLELE 2: NEGATIVE
GLUCOSE URINE, POC: NEGATIVE
PROTEIN UA: NEGATIVE

## 2021-11-23 PROCEDURE — G8419 CALC BMI OUT NRM PARAM NOF/U: HCPCS | Performed by: OBSTETRICS & GYNECOLOGY

## 2021-11-23 PROCEDURE — 81002 URINALYSIS NONAUTO W/O SCOPE: CPT | Performed by: OBSTETRICS & GYNECOLOGY

## 2021-11-23 PROCEDURE — 99213 OFFICE O/P EST LOW 20 MIN: CPT

## 2021-11-23 PROCEDURE — 76816 OB US FOLLOW-UP PER FETUS: CPT | Performed by: OBSTETRICS & GYNECOLOGY

## 2021-11-23 PROCEDURE — G8482 FLU IMMUNIZE ORDER/ADMIN: HCPCS | Performed by: OBSTETRICS & GYNECOLOGY

## 2021-11-23 PROCEDURE — 99213 OFFICE O/P EST LOW 20 MIN: CPT | Performed by: OBSTETRICS & GYNECOLOGY

## 2021-11-23 PROCEDURE — 1036F TOBACCO NON-USER: CPT | Performed by: OBSTETRICS & GYNECOLOGY

## 2021-11-23 PROCEDURE — 76819 FETAL BIOPHYS PROFIL W/O NST: CPT | Performed by: OBSTETRICS & GYNECOLOGY

## 2021-11-23 PROCEDURE — G8427 DOCREV CUR MEDS BY ELIG CLIN: HCPCS | Performed by: OBSTETRICS & GYNECOLOGY

## 2021-11-23 PROCEDURE — 99212 OFFICE O/P EST SF 10 MIN: CPT | Performed by: OBSTETRICS & GYNECOLOGY

## 2021-11-23 PROCEDURE — 76821 MIDDLE CEREBRAL ARTERY ECHO: CPT | Performed by: OBSTETRICS & GYNECOLOGY

## 2021-11-23 PROCEDURE — 76820 UMBILICAL ARTERY ECHO: CPT | Performed by: OBSTETRICS & GYNECOLOGY

## 2021-11-23 NOTE — PROGRESS NOTES
Here for pregnancy ultrasound. States good fetal movement. Denies lof, vaginal bleeding or contractions. Voiced no complaints.

## 2021-11-23 NOTE — LETTER
[facility]aternal Fetal [facility]  [address] [place] [address]  Phone: [phone]  Fax: [phone]           Ervin Ruth MD      November 29, 2021     Patient: Timmy Hernandez   MR Number: [id number]   YOB: 1995   Date of Visit: 11/23/2021       Dear Dr. Alonso Gaytan: Thank you for referring Timmy Hernandez to me for evaluation/treatment. Below are the relevant portions of my assessment and plan of care. If you have questions, please do not hesitate to call me. I look forward to following Imani along with you.     Sincerely,        Ervin Ruth MD    CC providers:  Vianey Soria, [phone]  Via In [facility]

## 2021-11-23 NOTE — PATIENT INSTRUCTIONS
Please arrive for your scheduled appointment at least 15 minutes early with your actual insurance card+ a photo ID. Also if you need any refills ordered or have questions, it may take up 48 hours to reply. Please allow ample time for your refills. Call me when you use last refill. Thank you for your cooperation. You might be having an NST at your next appt. Please eat a large snack or breakfast before coming to office. Thank youCall your primary obstetrician with bleeding, leaking of fluid, abdominal tenderness, headache, blurry vision, epigastric pain and increased urinary frequency. Any questions contact [address] [place] [address] at [phone]. If you are experiencing an emergency and need immediate help, call 911 or go to go emergency room or labor and delivery. if you are sick, not feeling well or have an infectious process going on please reschedule your appointment by calling [phone]. Also if any family members are not feeling well, please do not bring them to your appointment. We appreciate your cooperation. We are doing this in order to protect our pregnant mothers+ their babies.

## 2021-11-29 NOTE — PROGRESS NOTES
[facility]  [address] [place] [address].  Eliu Cruz [place], [place]. [address]  Ph: [phone] Fax: [phone]  2021     RE: Eric Garcia   [birth year]  Dear Dr. Justino Robles  : Thank you for sending  Ms. de Marlowe Paget    for consultation and ultrasound in our office on 2021    REASON FOR VISIT - 33yo [id number] @ 30w5d               PT speaks primarily Salvadorean. Communication via hospital  Ms Pete Walters.   ~ Prior c/s x 3 - all in [place] - no records available. She relates her last pregnancy with uterine rupture and baby loss was notably different with bleeding episodes throughout the pregnancy. This pregnancy is going well. ? Fetal Anatomy Survey z36  ? Fetal Growth and Development  o36.90x0  ? High risk pregnancy o09.89  ? Smoking o99.33  ? Fetal Exposure- drugs o35.5  - Marijuana F12  ? Late Prenatal Care o09.33  ? Obesity o99.21  ? Poor OB History o09.29   ? History of pregnancy complications W85.36  ? Prior  o34.21    - 2013 - 39w M 8#   - 2016 - 39w F 7:14   ? Prior Classical/ vertical CS  o34.212  - Rupture Uterus o71.1  -or- (?)   - Rupture C/S scar /dehiscence o71.81  (?)    Prior losses o26.2  ? ~Note - DSUA neg proteinuria, neg glucosuria today      Review of Systems :   · CONSTITUTIONAL: No fever, no chills , no undue aches, pain   · HEENT: No headache, no visual changes, no sore throat . No loss of smell, taste  · PULM: No dyspnea, no cough  · CARDIO:  No chest pains, no palpitations  · GI: No N/V, no D/C, no diarrhea, no abdominal pain   ·  : No dysuria, no vaginal bleeding or fluid leaking or discharge   · She reports good fetal movement   PHYSICAL EXAMINATION: VSS - Afebrile  BMI 34.11    BP 97/67   · General Appearance: Healthy looking, alert , no acute distress. · Eyes: No pallor, no icterus, no photophobia. · Back: No CVA tenderness. · Abdomen: Soft, non-tender.  C/S scars   · Extremities: No pretibial pitting edema scheduled  12/17/2021  5. I advised the patient that the [employer] of Obstetrics and Gynecology and The Society for Maternal Fetal Medicine recommend that all pregnant women be vaccinated for COVID-19. Information provided   6. Further evaluation and management will be dependent on her clinical presentation and the results of her testing. Once again, thank you for allowing us to participate in the care of this patient and if we can be of any further assistance to you, please do not hesitate to contact us. Sincerely,        Fady Lopez MD  I was present during her visit , supervised the ultrasound examination and provided the patient evaluation and management. The total time in minutes spent regarding the patient today, reviewing medical records, reviewing imaging studies, performing ultrasonic imaging, reviewing laboratory testing, and documenting information was 16 minutes, of which, 50% of the time was spent in patient education, counseling, and coordinating care with the patient, her provider, and/or her family. I answered all of her questions to her satisfaction.  I asked her to call if she had any additional questions prior to her next visit

## 2021-12-01 ENCOUNTER — ROUTINE PRENATAL (OUTPATIENT)
Dept: OBGYN | Age: 26
End: 2021-12-01
Payer: MEDICAID

## 2021-12-01 VITALS
DIASTOLIC BLOOD PRESSURE: 69 MMHG | WEIGHT: 184.6 LBS | HEART RATE: 108 BPM | SYSTOLIC BLOOD PRESSURE: 103 MMHG | BODY MASS INDEX: 34.88 KG/M2

## 2021-12-01 DIAGNOSIS — O21.9 NAUSEA AND VOMITING IN PREGNANCY: ICD-10-CM

## 2021-12-01 DIAGNOSIS — Z3A.31 31 WEEKS GESTATION OF PREGNANCY: Primary | ICD-10-CM

## 2021-12-01 LAB
GLUCOSE URINE, POC: NEGATIVE
INTERPRETATION: NORMAL
PROTEIN UA: NEGATIVE
SMA COPY NUMBER, LINKED VARIANT: NORMAL
SMA COPY NUMBER, SMN1 COPIES: NORMAL
SMA COPY NUMBER, SMN2 COPIES: NORMAL
SMA COPY NUMBER, SPECIMEN: NORMAL
SMA COPY NUMBER, SYMPTOMS: NORMAL

## 2021-12-01 PROCEDURE — 99212 OFFICE O/P EST SF 10 MIN: CPT | Performed by: OBSTETRICS & GYNECOLOGY

## 2021-12-01 PROCEDURE — 81002 URINALYSIS NONAUTO W/O SCOPE: CPT | Performed by: OBSTETRICS & GYNECOLOGY

## 2021-12-01 PROCEDURE — G8482 FLU IMMUNIZE ORDER/ADMIN: HCPCS | Performed by: OBSTETRICS & GYNECOLOGY

## 2021-12-01 PROCEDURE — 99213 OFFICE O/P EST LOW 20 MIN: CPT | Performed by: OBSTETRICS & GYNECOLOGY

## 2021-12-01 PROCEDURE — G8427 DOCREV CUR MEDS BY ELIG CLIN: HCPCS | Performed by: OBSTETRICS & GYNECOLOGY

## 2021-12-01 PROCEDURE — G8419 CALC BMI OUT NRM PARAM NOF/U: HCPCS | Performed by: OBSTETRICS & GYNECOLOGY

## 2021-12-01 PROCEDURE — 1036F TOBACCO NON-USER: CPT | Performed by: OBSTETRICS & GYNECOLOGY

## 2021-12-01 NOTE — PROGRESS NOTES
SUBJECTIVE:   32 y.o. [id number] female here for routine OB appointment. No complaints. Good FM. No LOF, VB, ctx. C/s scheduled. Needs steroids at 34 weeks. F/u 2 weeks. Start NSTs. OB History    Para Term  AB Living   4 3 3   0 2   SAB IAB Ectopic Molar Multiple Live Births   0         2      # Outcome Date GA Lbr Barber/2nd Weight Sex Delivery Anes PTL Lv   4 Current            3 Term 2017 38w0d    CS-LTranv   FD      Birth Comments: uterine rupture, still birth    2 Term 16 39w0d  7 lb 14 oz (3.572 kg) F CS-LTranv  Y MONA      Birth Comments: meconium aspiration   1 Term 04/15/13 39w0d  8 lb (3.629 kg) M CS-LTranv   MONA      Birth Comments: meconium aspiration       Past Medical History:   Diagnosis Date    Nausea and vomiting in pregnancy 10/10/2021        Past Surgical History:   Procedure Laterality Date     SECTION      x3        No family history on file.      Social History     Tobacco History     Smoking Status  Never Smoker    Smokeless Tobacco Use  Never Used          Alcohol History     Alcohol Use Status  Not Currently          Drug Use     Drug Use Status  Not Currently          Sexual Activity     Sexually Active  Not Asked                  Current Outpatient Medications:     Prenatal Vit-Fe Fumarate-FA (PRENATAL PLUS) 27-1 MG TABS, Take 1 tablet by mouth daily, Disp: 90 tablet, Rfl: 3    ondansetron (ZOFRAN) 4 MG tablet, Take 1 tablet by mouth every 8 hours as needed for Nausea or Vomiting, Disp: 30 tablet, Rfl: 0    famotidine (PEPCID) 20 MG tablet, Take 1 tablet by mouth daily, Disp: 60 tablet, Rfl: 3     No Known Allergies     OBJECTIVE:   /69   Pulse 108   Wt 184 lb 9.6 oz (83.7 kg)   BMI 34.88 kg/m²     Mother's Prenatal Vitals  BP: 103/69  Weight: 184 lb 9.6 oz (83.7 kg)  Pulse: 108  Patient Position: Sitting  Alb/Glu  Albumin: Negative  Glucose: Negative  Prenatal Fetal Information  Fundal Height (cm): 32 cm  Fetal Heart Rate: 135  Movement: Present      ASSESSMENT:   1. Michelle Baker is a 32 y.o. female  2. [id number]  3. 31w6d  4. Transfer of care at [facility] weeks from [facility]  5. Three previous  sections  6. Uterine rupture at 38 weeks with fetal demise-plan for repeat c/s at 36-37 weeks and steroids at 34 weeks  7. THC abuse  8. Rubella nonimmune  9. Varicella equivical  Patient Active Problem List    Diagnosis Date Noted    Nausea and vomiting in pregnancy 10/10/2021    24 weeks gestation of pregnancy 10/09/2021        Diagnosis Orders   1. 31 weeks gestation of pregnancy     2. Nausea and vomiting in pregnancy         PLAN:     No orders of the defined types were placed in this encounter. Return for Prenatal visit, continue NSTs twice weekly.       Electronically signed by Ying Gomez DO on 21

## 2021-12-06 ENCOUNTER — ANCILLARY PROCEDURE (OUTPATIENT)
Dept: OBGYN CLINIC | Age: 26
End: 2021-12-06
Payer: MEDICAID

## 2021-12-06 ENCOUNTER — ROUTINE PRENATAL (OUTPATIENT)
Dept: OBGYN CLINIC | Age: 26
End: 2021-12-06
Payer: MEDICAID

## 2021-12-06 VITALS
WEIGHT: 185 LBS | HEART RATE: 89 BPM | SYSTOLIC BLOOD PRESSURE: 99 MMHG | DIASTOLIC BLOOD PRESSURE: 68 MMHG | BODY MASS INDEX: 34.96 KG/M2

## 2021-12-06 DIAGNOSIS — Z87.828 HISTORY OF RUPTURE OF UTERUS: ICD-10-CM

## 2021-12-06 DIAGNOSIS — Z3A.32 32 WEEKS GESTATION OF PREGNANCY: Primary | ICD-10-CM

## 2021-12-06 DIAGNOSIS — Z98.891 HISTORY OF C-SECTION: ICD-10-CM

## 2021-12-06 PROCEDURE — 1036F TOBACCO NON-USER: CPT | Performed by: OBSTETRICS & GYNECOLOGY

## 2021-12-06 PROCEDURE — G8419 CALC BMI OUT NRM PARAM NOF/U: HCPCS | Performed by: OBSTETRICS & GYNECOLOGY

## 2021-12-06 PROCEDURE — 99214 OFFICE O/P EST MOD 30 MIN: CPT | Performed by: OBSTETRICS & GYNECOLOGY

## 2021-12-06 PROCEDURE — 76818 FETAL BIOPHYS PROFILE W/NST: CPT | Performed by: OBSTETRICS & GYNECOLOGY

## 2021-12-06 PROCEDURE — G8482 FLU IMMUNIZE ORDER/ADMIN: HCPCS | Performed by: OBSTETRICS & GYNECOLOGY

## 2021-12-06 PROCEDURE — 76815 OB US LIMITED FETUS(S): CPT | Performed by: OBSTETRICS & GYNECOLOGY

## 2021-12-06 PROCEDURE — 76821 MIDDLE CEREBRAL ARTERY ECHO: CPT | Performed by: OBSTETRICS & GYNECOLOGY

## 2021-12-06 PROCEDURE — 76820 UMBILICAL ARTERY ECHO: CPT | Performed by: OBSTETRICS & GYNECOLOGY

## 2021-12-06 PROCEDURE — G8427 DOCREV CUR MEDS BY ELIG CLIN: HCPCS | Performed by: OBSTETRICS & GYNECOLOGY

## 2021-12-06 PROCEDURE — 99213 OFFICE O/P EST LOW 20 MIN: CPT | Performed by: OBSTETRICS & GYNECOLOGY

## 2021-12-06 NOTE — PATIENT INSTRUCTIONS
Patient Education        Monae Mckayla a hardy médico yusuf el [address] [facility] [address] (después de 20 semanas)  Learning About When to Call Your Doctor During Pregnancy (After 20 Weeks)  Instrucciones de cuidado  Es normal que tenga inquietudes acerca de lo que podría ser un problema yusuf el [address] [facility] [address]. Aunque la mayoría de las mujeres embarazadas no tienen ningún problema grave, es importante saber cuándo llamar a hardy médico si tiene determinados síntomas o señales de trabajo de [place]. Estas son algunas sugerencias generales. Hardy médico puede darle más información sobre cuándo llamar. Cuándo llamar a hardy médico (después de 20 semanas)  Llame al 911  en cualquier momento que considere que necesita atención de [place]. Por ejemplo, llame si:  · Tiene sangrado vaginal intenso. · Tiene dolor repentino e intenso en el abdomen. · Se desmayó (perdió el conocimiento). · Tiene oswaldo convulsión. · Ve o siente el cordón umbilical.  · Cherry que está a punto de coty a conchis a hardy bebé y no puede llegar en forma gandara al hospital.  Elinor Mcdaniel a hardy médico ahora mismo o busque atención médica inmediata si:  · Tiene sangrado vaginal.  · Tiene dolor en el abdomen. · Tiene fiebre. · Tiene síntomas de preeclampsia, jose francisco:  ? Hinchazón repentina de la emil, las yulissa o los pies. ? Nuevos problemas de visión (jose francisco oscurecimiento, dianna borroso o dianna puntos). ? Dolor de luan intenso. · Tiene oswaldo pérdida repentina de líquido por la vagina. (Piensa que rompió la jerald). · Piensa que puede luther comenzado el [place] de [place]. [place] significa que ha tenido al menos 6 contracciones en [employer]. · Nota que hardy bebé ha dejado de moverse o lo hace mucho menos de lo habitual.  · Tiene síntomas de oswaldo infección urinaria. Estos pueden incluir:  ? Dolor o ardor al orinar. ? Necesidad de orinar con frecuencia sin poder eliminar mucha orina.   ? Dolor en el flanco, que se encuentra felipe debajo de la caja torácica y [place] de la cintura en un lado de la espalda. ? [employer]. Preste especial atención a los cambios en hardy alberto y asegúrese de comunicarse con hardy médico si:  · Tiene flujo vaginal con un olor desagradable. · Tiene cambios en la piel, tales jose francisco:  ? Salpullido. ? Comezón. ? Color amarillento en la piel. · Tiene otras inquietudes acerca de hardy embarazo. Si tiene signos de trabajo de parto al llegar a las [address] o más  Si tiene señales de [place] de parto a las 37 semanas o [place], es posible que hardy médico le diga que llame cuando hardy trabajo de parto se vuelva más [place]. Los síntomas del trabajo de parto activo incluyen:  · Contracciones que son regulares. · Contracciones a intervalos de menos de 5 minutos. · Contracciones yusuf las cuales es difícil hablar. La atención de seguimiento es oswaldo parte clave de hardy tratamiento y seguridad. Asegúrese de hacer y acudir a todas las citas, y llame a hardy médico si está teniendo problemas. También es oswaldo buena idea saber los resultados de azucena exámenes y mantener oswaldo lista de los medicamentos que emilio. ¿Dónde puede encontrar más información en inglés? Jenny Panda a [email or web address] e ingrese a hardy cuenta de MyChart. Adine Oven J076 en el [place] \"Search Health Information\" para más información (en inglés) sobre \"Aprenda cuándo llamar a hardy médico yusuf el embarazo (después de 20 semanas). \"     Si no tiene oswaldo cuenta, ten heidy en el enlace \"Sign Up Now\". Revisado: 16 junio, 2021               Versión del contenido: 13.0  © 2879-7712 Healthwise, [employer]. Las instrucciones de cuidado fueron adaptadas bajo licencia por [facility] ([facility]). Si usted tiene [place] afección médica o sobre estas instrucciones, siempre pregunte a hardy profesional de alberto. brotips, [employer] niega toda garantía o responsabilidad por hardy uso de esta información.

## 2021-12-06 NOTE — PROGRESS NOTES
2021      Kaylee Guevara, [address]  [facility],  [address] [place] [address]     RE:  Moy Juárez  : 1995   AGE: 32 y.o. This report has been created using voice recognition software. It may contain errors which are inherent in voice recognition technology. Dear Dr. Huey Mccloud:      I had the pleasure of meeting with Ms. Hernandez Padilla for a return consultation. As you know, Ms. Hernandez Padilla  is a 32 y.o. [id number] at 32w4d (7-week ultrasound) who is being followed by our office for a history of uterine rupture and fetal demise with her last pregnancy. Today, Ms. Hernandez Padilla reports that she feels well. She notes good fetal movement and denies any symptoms of leaking of fluid, vaginal bleeding, and/or contractions. She had a fetal ultrasound that was notable for the following. There is a single intrauterine gestation in a cephalic presentation with a heart rate of 136 beats per minute. The placenta is anterior. The amniotic fluid index is 18.6 cm. BPP 8/8. Umbilical artery Doppler studies are normal. MCA PSV normal.      PERTINENT PHYSICAL EXAMINATION:   BP 99/68   Pulse 89   Wt 185 lb (83.9 kg)   BMI 34.96 kg/m²     Urine dipstick:   The patient did not provide a urine      A fetal ultrasound assessment was performed today. A report is enclosed for your review. Assessment & Plan:  32 y.o. [id number] at 32w4d ([address]) with:    1. History of uterine rupture -- The patient is being followed by maternal-fetal medicine secondary to a history of a uterine rupture and stillbirth at 38 weeks gestation in 2017. This was the patient's third  delivery. Counseling was previously provided to the patient. Counseling was reviewed today. The patient did not have any additional questions or concerns. Again, there is limited data regarding pregnancies in women with a history of a uterine rupture. The estimated report of recurrence varies widely and ranges from 0 to 40%.  The risk of recurrent rupture is highest if the prior rupture was in the fundus or longitudinal. Recurrent rupture can occur as early as the second trimester and is difficult to predict. Precautions were reviewed with the patient. Per ACOG, delivery is recommended at 36-37 weeks gestation via repeat . In some cases, delivery can be performed prior to 36 weeks gestation. Delivery timing should be individualized. Factors such as timing of the prior uterine rupture and/or  labor symptoms should be considered. Given the recommendation is for a late  delivery, a course of betamethasone should be administered 2 to 7 days prior to the scheduled delivery. The patient was counseled to present to labor and delivery with any  uterine contractions, abdominal pain, and or vaginal bleeding. There is any concern for recurrent rupture, an emergency  is recommended. 2.  Obesity in pregnancy -- Counseling was previously provided. She did not have any additional questions or concerns today. The patient's BMI is 34. Given the increased risks previously described, additional testing is recommended. Fetal growth should be monitored every 3-4 weeks starting at 24-26 weeks' gestation. Fetal growth was appropriate for the gestational age today. The patient should monitor fetal kick counts daily, starting at 28 weeks' gestation. She should be scheduled for increased fetal surveillance with twice weekly fetal testing after 32 weeks' gestation. In the absence of any complications, delivery is recommended at 39 weeks' gestation. Given the increased risk for hypertensive disorders of pregnancy,  the potential utility of a low dose aspirin in reducing her risk for hypertensive disorders of pregnancy was reviewed. The risks and benefits of low dose aspirin were discussed. She was counseled that she could take 81 mg of aspirin, daily. A prescription was provided.     3.  History of  X3 -- The patient has had 3 prior C-sections. Her most recent pregnancy was complicated by a uterine rupture at 30 weeks gestation. The placenta is anterior but above the level of the lower uterine segment. She plans to have a repeat  for delivery, see above. 4.  Vaccination in pregnancy -- The patient was counseled regarding the recommendations for vaccination in pregnancy. The patient was counseled regarding the recommendations for the Tdap vaccination in pregnancy. Risks and benefits were discussed. The vaccination is typically administered between 27 and 36 weeks gestation and recommended to confer protection against whooping cough to the . The patient plans to discuss this with her primary provider at her next visit. The patient was also counseled that her partner in any individuals who will be in close contact with the  should also be vaccinated for whooping cough. --Patient reports that she received a Tdap    The patient was counseled regarding recommendation for the flu vaccination in pregnancy for both maternal and infant safety. Risks and benefits were reviewed. She was encouraged to have this vaccination as an outpatient. --The patient reports that she received a flu vaccination    Counseling was provided regarding the recommendation for the Covid vaccination in pregnancy. I advised the patient that the [employer] of Obstetrics and Gynecology and The Society for Maternal Fetal Medicine recommend that all pregnant women be vaccinated for COVID-19. \"Data have shown that COVID-19 infection puts pregnant people at increased risk of severe complications and even death; yet only about 22% of pregnant individuals have received 1 more doses of COVID-19 vaccine according to the [employer] for Disease Control and Prevention. \"    \" Recent data have shown that more than 95% of those were hospitalized and/or dying from COVID-19 are those who have remained unvaccinated.   Pregnant individuals who have decided to wait until after delivery to be vaccinated may be inadvertently exposing themselves to an increased risk of severe illness or death. \"     \" COVID-19 vaccination is the best testing to reduce maternal and fetal complications of YLHWX-99 infection among pregnant people,\" said Mayela Curtis MD, president of the Society for Maternal Fetal Medicine, sub-specialists. The patient was counseled that in the event she opts not to have the Covid vaccination, she should alert her provider immediately if she test positive for Covid. Pregnant women are on the priority list for treatment with monoclonal antibody. This intervention has been shown to decrease the risk for hospitalization and complications related to Covid in pregnancy. The patient expressed verbal understanding of this counseling. --I requested the patient return for a follow-up assessment in 1 week unless there is a clinical reason for her to return prior to that time. She is to call if she has any problems or questions prior to her next visit. Further evaluation and management will be dependent on her clinical presentation and the results of her testing. --The patient was advised to call if she has any increased vaginal discharge, vaginal bleeding, contractions, abdominal pain, back pain or any new significant symptomatology prior to her next visit. I advised her that these are signs and symptoms of cervical change and require follow-up assessment when they occur. Preeclampsia precautions were also reviewed with the patient. --The patient is to continue to follow with you in your office for ongoing obstetric care. --The total time spent on today's visit was 30 minutes.   This included preparation for the visit (i.e. reviewing prior external notes and test results), performance of a medically appropriate history and examination, counseling, orders for medications, tests or other procedures, and coordination of care. Greater than 50% of the time was spent face-to-face with the patient. This time is exclusive of procedures performed. I answered all of  the patient's questions to her satisfaction. I asked her to call if she had any additional questions prior to her next visit. --At the conclusion of the visit, the patient appeared to have a good understanding of the issues discussed. I answered all of her questions to her satisfaction. I asked her to call if she had any additional questions prior to her next visit. --Thank you for allowing me to participate in the care of this pleasant patient. Please don't hesitate to call me if you have any questions. Sincerely,      Alayna Burris [place], [address] [facility]  [phone]      *All or parts of this note may have been generated using a voice recognition program. There may be typo, grammar, or Word substitution errors that have escaped my review of this note.

## 2021-12-06 NOTE — LETTER
[facility] Maternal Fetal Medicine  [address] [facility] [address]  Phone: [phone]  Fax: [phone]           Caden Conley MD      2021    Patient: Lena Ratliff   MR Number: [id number]   YOB: 1995   Date of Visit: 2021       Dear Dr. Filiberto Novak: Thank you for referring Lena Ratliff to me for evaluation/treatment. Below are the relevant portions of my assessment and plan of care. If you have questions, please do not hesitate to call me. I look forward to following Imani along with you. Sincerely,        Caden Conley MD    CC providers:  [facility] [address]  [address] [place] [address], [address]      Samantha Zheng MD  [address]     RE:  Guicho Corado  : 1995   AGE: 32 y.o. This report has been created using voice recognition software. It may contain errors which are inherent in voice recognition technology. Dear Dr. Filiberto Novak:      I had the pleasure of meeting with Ms. Amin for a return consultation. As you know, Ms. Martha Shepherd  is a 32 y.o. [id number] at 32w4d (7-week ultrasound) who is being followed by our office for a history of uterine rupture and fetal demise with her last pregnancy. Today, Ms. Amin reports that she feels well. She notes good fetal movement and denies any symptoms of leaking of fluid, vaginal bleeding, and/or contractions. She had a fetal ultrasound that was notable for the following. There is a single intrauterine gestation in a cephalic presentation with a heart rate of 136 beats per minute. The placenta is anterior. The amniotic fluid index is 18.6 cm. BPP 8/8.  Umbilical artery Doppler studies are normal. MCA PSV normal.      PERTINENT PHYSICAL EXAMINATION:   BP 99/68   Pulse 89   Wt 185 lb (83.9 kg)   BMI 34.96 kg/m²     Urine dipstick:   The patient did not provide a urine      A fetal ultrasound assessment was performed today. A report is enclosed for your review. Assessment & Plan:  32 y.o. B1T1981 at 32w4d ([address]) with:    1. History of uterine rupture -- The patient is being followed by maternal-fetal medicine secondary to a history of a uterine rupture and stillbirth at 38 weeks gestation in 2017. This was the patient's third  delivery. Counseling was previously provided to the patient. Counseling was reviewed today. The patient did not have any additional questions or concerns. Again, there is limited data regarding pregnancies in women with a history of a uterine rupture. The estimated report of recurrence varies widely and ranges from 0 to 40%. The risk of recurrent rupture is highest if the prior rupture was in the fundus or longitudinal. Recurrent rupture can occur as early as the second trimester and is difficult to predict. Precautions were reviewed with the patient. Per ACOG, delivery is recommended at 36-37 weeks gestation via repeat . In some cases, delivery can be performed prior to 36 weeks gestation. Delivery timing should be individualized. Factors such as timing of the prior uterine rupture and/or  labor symptoms should be considered. Given the recommendation is for a late  delivery, a course of betamethasone should be administered 2 to 7 days prior to the scheduled delivery. The patient was counseled to present to labor and delivery with any  uterine contractions, abdominal pain, and or vaginal bleeding. There is any concern for recurrent rupture, an emergency  is recommended. 2.  Obesity in pregnancy -- Counseling was previously provided. She did not have any additional questions or concerns today. The patient's BMI is 34. Given the increased risks previously described, additional testing is recommended. Fetal growth should be monitored every 3-4 weeks starting at 24-26 weeks' gestation.   Fetal growth was appropriate for the gestational age today. The patient should monitor fetal kick counts daily, starting at 28 weeks' gestation. She should be scheduled for increased fetal surveillance with twice weekly fetal testing after 32 weeks' gestation. In the absence of any complications, delivery is recommended at 39 weeks' gestation. Given the increased risk for hypertensive disorders of pregnancy,  the potential utility of a low dose aspirin in reducing her risk for hypertensive disorders of pregnancy was reviewed. The risks and benefits of low dose aspirin were discussed. She was counseled that she could take 81 mg of aspirin, daily. A prescription was provided. 3.  History of  X3 -- The patient has had 3 prior C-sections. Her most recent pregnancy was complicated by a uterine rupture at 30 weeks gestation. The placenta is anterior but above the level of the lower uterine segment. She plans to have a repeat  for delivery, see above. 4.  Vaccination in pregnancy -- The patient was counseled regarding the recommendations for vaccination in pregnancy. The patient was counseled regarding the recommendations for the Tdap vaccination in pregnancy. Risks and benefits were discussed. The vaccination is typically administered between 27 and 36 weeks gestation and recommended to confer protection against whooping cough to the . The patient plans to discuss this with her primary provider at her next visit. The patient was also counseled that her partner in any individuals who will be in close contact with the  should also be vaccinated for whooping cough. --Patient reports that she received a Tdap    The patient was counseled regarding recommendation for the flu vaccination in pregnancy for both maternal and infant safety. Risks and benefits were reviewed. She was encouraged to have this vaccination as an outpatient.   --The patient reports that she received a flu vaccination    Counseling was provided regarding the recommendation for the Covid vaccination in pregnancy. I advised the patient that the [facility] of Obstetrics and Gynecology and The Society for Maternal Fetal Medicine recommend that all pregnant women be vaccinated for COVID-19. \"Data have shown that COVID-19 infection puts pregnant people at increased risk of severe complications and even death; yet only about 22% of pregnant individuals have received 1 more doses of COVID-19 vaccine according to the [employer] for Disease Control and Prevention. \"    \" Recent data have shown that more than 95% of those were hospitalized and/or dying from COVID-19 are those who have remained unvaccinated. Pregnant individuals who have decided to wait until after delivery to be vaccinated may be inadvertently exposing themselves to an increased risk of severe illness or death. \"     \" COVID-19 vaccination is the best testing to reduce maternal and fetal complications of YNVJH-56 infection among pregnant people,\" said Kerry Pacheco MD, president of the Society for Maternal Fetal Medicine, sub-specialists. The patient was counseled that in the event she opts not to have the Covid vaccination, she should alert her provider immediately if she test positive for Covid. Pregnant women are on the priority list for treatment with monoclonal antibody. This intervention has been shown to decrease the risk for hospitalization and complications related to Covid in pregnancy. The patient expressed verbal understanding of this counseling. --I requested the patient return for a follow-up assessment in 1 week unless there is a clinical reason for her to return prior to that time. She is to call if she has any problems or questions prior to her next visit. Further evaluation and management will be dependent on her clinical presentation and the results of her testing.      --The patient was advised to call if she has any increased vaginal discharge, vaginal bleeding, contractions, abdominal pain, back pain or any new significant symptomatology prior to her next visit. I advised her that these are signs and symptoms of cervical change and require follow-up assessment when they occur. Preeclampsia precautions were also reviewed with the patient. --The patient is to continue to follow with you in your office for ongoing obstetric care. --The total time spent on today's visit was 30 minutes. This included preparation for the visit (i.e. reviewing prior external notes and test results), performance of a medically appropriate history and examination, counseling, orders for medications, tests or other procedures, and coordination of care. Greater than 50% of the time was spent face-to-face with the patient. This time is exclusive of procedures performed. I answered all of  the patient's questions to her satisfaction. I asked her to call if she had any additional questions prior to her next visit. --At the conclusion of the visit, the patient appeared to have a good understanding of the issues discussed. I answered all of her questions to her satisfaction. I asked her to call if she had any additional questions prior to her next visit. --Thank you for allowing me to participate in the care of this pleasant patient. Please don't hesitate to call me if you have any questions. Sincerely,      Baljit Lezama MD, [facility] [address]  [phone]      *All or parts of this note may have been generated using a voice recognition program. There may be typo, grammar, or Word substitution errors that have escaped my review of this note.

## 2021-12-09 ENCOUNTER — CLINICAL DOCUMENTATION (OUTPATIENT)
Dept: OBGYN | Age: 26
End: 2021-12-09

## 2021-12-09 ENCOUNTER — ROUTINE PRENATAL (OUTPATIENT)
Dept: OBGYN CLINIC | Age: 26
End: 2021-12-09
Payer: MEDICAID

## 2021-12-09 ENCOUNTER — ROUTINE PRENATAL (OUTPATIENT)
Dept: OBGYN | Age: 26
End: 2021-12-09
Payer: MEDICAID

## 2021-12-09 VITALS
BODY MASS INDEX: 35.55 KG/M2 | SYSTOLIC BLOOD PRESSURE: 104 MMHG | HEART RATE: 101 BPM | WEIGHT: 188.13 LBS | DIASTOLIC BLOOD PRESSURE: 64 MMHG

## 2021-12-09 VITALS
SYSTOLIC BLOOD PRESSURE: 94 MMHG | BODY MASS INDEX: 34.8 KG/M2 | DIASTOLIC BLOOD PRESSURE: 64 MMHG | WEIGHT: 184.2 LBS | HEART RATE: 109 BPM

## 2021-12-09 DIAGNOSIS — Z3A.33 33 WEEKS GESTATION OF PREGNANCY: Primary | ICD-10-CM

## 2021-12-09 DIAGNOSIS — O21.9 NAUSEA AND VOMITING IN PREGNANCY: ICD-10-CM

## 2021-12-09 DIAGNOSIS — Z34.93 PRENATAL CARE IN THIRD TRIMESTER: Primary | ICD-10-CM

## 2021-12-09 LAB
GLUCOSE URINE, POC: NEGATIVE
GLUCOSE URINE, POC: NEGATIVE
PROTEIN UA: NEGATIVE
PROTEIN UA: POSITIVE

## 2021-12-09 PROCEDURE — G8482 FLU IMMUNIZE ORDER/ADMIN: HCPCS | Performed by: MIDWIFE

## 2021-12-09 PROCEDURE — 99213 OFFICE O/P EST LOW 20 MIN: CPT | Performed by: MIDWIFE

## 2021-12-09 PROCEDURE — G8427 DOCREV CUR MEDS BY ELIG CLIN: HCPCS | Performed by: MIDWIFE

## 2021-12-09 PROCEDURE — 99213 OFFICE O/P EST LOW 20 MIN: CPT | Performed by: OBSTETRICS & GYNECOLOGY

## 2021-12-09 PROCEDURE — 81002 URINALYSIS NONAUTO W/O SCOPE: CPT | Performed by: MIDWIFE

## 2021-12-09 PROCEDURE — 59025 FETAL NON-STRESS TEST: CPT | Performed by: OBSTETRICS & GYNECOLOGY

## 2021-12-09 PROCEDURE — 81002 URINALYSIS NONAUTO W/O SCOPE: CPT | Performed by: OBSTETRICS & GYNECOLOGY

## 2021-12-09 PROCEDURE — 1036F TOBACCO NON-USER: CPT | Performed by: MIDWIFE

## 2021-12-09 PROCEDURE — 99212 OFFICE O/P EST SF 10 MIN: CPT | Performed by: MIDWIFE

## 2021-12-09 PROCEDURE — G8419 CALC BMI OUT NRM PARAM NOF/U: HCPCS | Performed by: MIDWIFE

## 2021-12-09 NOTE — PATIENT INSTRUCTIONS
Patient Education        Semanas 32 a 29 de bravo embarazo: Instrucciones de cuidado  Weeks 32 to 34 of Your Pregnancy: Care Instructions  Instrucciones de cuidado     [address] las [place] de bravo hema Mandel podría sentir más noreen y [place]. Es importante que descanse cuando pueda. Bravo bebé en crecimiento está ejerciendo más presión sobre bravo vejiga. Por eso, anastasia vez necesite orinar con más frecuencia. Las hemorroides también son comunes. Estas son venas en la renato del recto que causan dolor y comezón. En la semana 36, a la mayoría de las [employer] un análisis para detectar el estreptococo del brianna B (GBS, por azucena siglas en inglés). El GBS es oswaldo bacteria común que puede vivir en la vagina y el recto. Podría enfermar a bravo bebé después del nacimiento. Si el [employer] positivo, le darán antibióticos yusuf el [place] de [place]. Estos prevendrán que el bebé se contagie con la bacteria. Podría convenirle hablar con bravo médico sobre poner en un banco la casey del cordón umbilical de bravo bebé. Esta es la casey que queda en el cordón después del nacimiento. Si desea guardar esta casey, debe hacer los trámites necesarios con anticipación. No puede decidirlo en el último minuto. Si todavía no le patrick aplicado la vacuna Tdap (tétanos, difteria y tos [place]) yusuf sameera Ministerio, hable con bravo médico acerca de aplicársela. [place] a proteger a bravo recién nacido contra la infección por tos ferina. La atención de seguimiento es oswaldo parte clave de bravo tratamiento y seguridad. Asegúrese de hacer y acudir a todas las citas, y llame a bravo médico si está teniendo problemas. También es oswaldo buena idea saber los resultados de azucena exámenes y mantener oswaldo lista de los medicamentos que emilio. ¿Cómo puede cuidarse en el hogar? Alivio de las hemorroides  · Aumente en bravo dieta la cantidad de líquidos, frutas, verduras y [place]. [place] ayudará a Janene Markham. · Evite estar sentada yusuf demasiado tiempo. Recuéstese sobre el lado alyx varias veces al día. · Límpiese con papel higiénico suave y húmedo. O puede utilizar compresas de infusión de hamamelis [facility] (\"witch hazel\") o toallas de higiene personal.  · Si tiene malestar, pruebe a usar compresas de hielo. O puede hacer joni de asiento tibios. Hágalos yusuf 20 minutos por vez, según lo necesite. · Use oswaldo crema con hidrocortisona para el dolor y la picazón. Meka Whiteside son Anusol y Preparation H Hydrocortisone. · Pregúntele a hardy médico si puede temo un ablandador de heces de venta aristeo. Considere el amamantamiento  · Los especialistas recomiendan que las mujeres amamanten por 1 año o [place]. · [place] ayudar a proteger a hardy hijo contra algunos problemas de alberto. En comparación con los bebés que [address] [facility], los bebés que natanael leche materna tienen menos probabilidades de:  ? Alcus Babar infecciones de oído, resfriados, diarrea y neumonía. ? Ser obesos o tener diabetes más adelante en hardy bailey. · [employer] a nory bebés tienen menos sangrado después del [place]. Nory úteros también recobran hardy tamaño normal más rápido. · Algunas mujeres que amamantan bajan de Shane rápido. Elaborar leche quema calorías. · El amamantamiento puede disminuir el riesgo de tener cáncer de seno (mama), cáncer de ovario y osteoporosis. Decida sobre la circuncisión para los niños  · Al temo esta decisión, podría ser de ayuda pensar en nory tradiciones personales, religiosas y familiares. Es hardy decisión si desea conservar el pene de hardy hijo natural o circuncidar a hardy hijo. · Si decide que le gustaría que circuncidaran a hardy bebé, hable con hardy médico. Discuta cualquier inquietud que tenga sobre el dolor. También puede hablar acerca de nory preferencias para la anestesia. ¿Dónde puede encontrar más información en inglés? Creasie Laos a [email or web address] e ingrese a hardy cuenta de MyChart.  Meño Turcios [address] en el [facility] \"Search Health Information\" para más información (en inglés) sobre \"Semanas 28 a 29 de hardy embarazo: Instrucciones de cuidado. \"     Si no tiene oswaldo cuenta, ten clic en el enlace \"Sign Up Now\". Revisado: 16 junio, 2021               Versión del contenido: 13.0  © 9815-3131 Healthwise, Incorporated. Las instrucciones de cuidado fueron adaptadas bajo licencia por [facility] CARE ([facility]). Si usted tiene [place] afección médica o sobre estas instrucciones, siempre pregunte a hardy profesional de alberto. Healthwise, Incorporated niega toda garantía o responsabilidad por hardy uso de esta información. Patient Education        Toribio Bethany a hardy médico yusuf el [address] [facility] [address] (después de 20 semanas)  Learning About When to Call Your Doctor During Pregnancy (After 20 Weeks)  Instrucciones de cuidado  Es normal que tenga inquietudes acerca de lo que podría ser un problema yusuf el [address] [facility] [address]. Aunque la mayoría de las mujeres embarazadas no tienen ningún problema grave, es importante saber cuándo llamar a hardy médico si tiene determinados síntomas o señales de trabajo de [place]. Estas son algunas sugerencias generales. Hardy médico puede darle más información sobre cuándo llamar. Cuándo llamar a hardy médico (después de 20 semanas)  Llame al 911  en cualquier momento que considere que necesita atención de [place]. Por ejemplo, llame si:  · Tiene sangrado vaginal intenso. · Tiene dolor repentino e intenso en el abdomen. · Se desmayó (perdió el conocimiento). · Tiene oswaldo convulsión. · Ve o siente el cordón umbilical.  · Cherry que está a punto de coty a conchis a hardy bebé y no puede llegar en forma gandara al hospital.  Erika Peper a hardy médico ahora mismo o busque atención médica inmediata si:  · Tiene sangrado vaginal.  · Tiene dolor en el abdomen. · Tiene fiebre. · Tiene síntomas de preeclampsia, jose francisco:  ? Hinchazón repentina de la emil, las yulissa o los pies.   ? Nuevos problemas de visión (jose francisco oscurecimiento, dianna borroso o dianna puntos). ? Dolor de luan intenso. · Tiene oswaldo pérdida repentina de líquido por la vagina. (Piensa que rompió la jerald). · Piensa que puede luther comenzado el [place] de [place]. [place] significa que ha tenido al menos 6 contracciones en [employer]. · Nota que hardy bebé ha dejado de moverse o lo hace mucho menos de lo habitual.  · Tiene síntomas de oswaldo infección urinaria. Estos pueden incluir:  ? Dolor o ardor al orinar. ? Necesidad de orinar con frecuencia sin poder eliminar mucha orina. ? Dolor en el flanco, que se encuentra felipe debajo de la caja torácica y [place] de la cintura en un lado de la espalda. ? [employer]. Preste especial atención a los cambios en hardy alberto y asegúrese de comunicarse con hardy médico si:  · Tiene flujo vaginal con un olor desagradable. · Tiene cambios en la piel, tales jose francisco:  ? Salpullido. ? Comezón. ? Color amarillento en la piel. · Tiene otras inquietudes acerca de hardy embarazo. Si tiene signos de trabajo de parto al llegar a las [address] o más  Si tiene señales de [place] de parto a las 37 semanas o [place], es posible que hardy médico le diga que llame cuando hardy trabajo de parto se vuelva más [place]. Los síntomas del trabajo de parto activo incluyen:  · Contracciones que son regulares. · Contracciones a intervalos de menos de 5 minutos. · Contracciones yusuf las cuales es difícil hablar. La atención de seguimiento es oswaldo parte clave de hardy tratamiento y seguridad. Asegúrese de hacer y acudir a todas las citas, y llame a hardy médico si está teniendo problemas. También es oswaldo buena idea saber los resultados de azucena exámenes y mantener oswaldo lista de los medicamentos que emilio. ¿Dónde puede encontrar más información en inglés? Priyanka Parent a [email or web address] e ingrese a hardy cuenta de MyChartMazin Solorzano [address] en el Amarilis Mukherjee \"Search Health Information\" para más información (en inglés) sobre \"Aprenda cuándo llamar a hardy médico yusuf el embarazo (después de 20 semanas). \" Si no tiene [place], ten heidy en el enlace \"Sign Up Now\". Revisado: 16 junio, 2021               Versión del contenido: 13.0  © 0887-4037 Healthwise, Incorporated. Las instrucciones de cuidado fueron adaptadas bajo licencia por [facility] ([facility]). Si usted tiene [place] afección médica o sobre estas instrucciones, siempre pregunte a hardy profesional de alberto. TiVo, [employer]Tu niega toda garantía o responsabilidad por hardy uso de esta información.

## 2021-12-09 NOTE — PROGRESS NOTES
Patient participated in [facility]   Topics of discussion; Feelings patient might be feeling during postpartum,  Postpartum depression, anxiety and kick counts. Patient received Diapers size 2 and baby wipes.

## 2021-12-15 ENCOUNTER — ROUTINE PRENATAL (OUTPATIENT)
Dept: OBGYN | Age: 26
End: 2021-12-15
Payer: MEDICAID

## 2021-12-15 VITALS
DIASTOLIC BLOOD PRESSURE: 74 MMHG | SYSTOLIC BLOOD PRESSURE: 114 MMHG | HEART RATE: 92 BPM | WEIGHT: 186 LBS | BODY MASS INDEX: 35.14 KG/M2

## 2021-12-15 DIAGNOSIS — O21.9 NAUSEA AND VOMITING IN PREGNANCY: ICD-10-CM

## 2021-12-15 DIAGNOSIS — Z3A.33 33 WEEKS GESTATION OF PREGNANCY: Primary | ICD-10-CM

## 2021-12-15 PROCEDURE — 99212 OFFICE O/P EST SF 10 MIN: CPT | Performed by: OBSTETRICS & GYNECOLOGY

## 2021-12-15 NOTE — PROGRESS NOTES
SUBJECTIVE:   Pt seen with . 32 y.o. [id number] female here for routine OB appointment. No complaints. Good FM. No LOF, VB, ctx. C/s scheduled. Needs steroids at 34 weeks. To go tomorrow F/u 2 weeks, continue NSTs    OB History    Para Term  AB Living   4 3 3   0 2   SAB IAB Ectopic Molar Multiple Live Births   0         2      # Outcome Date GA Lbr Barber/2nd Weight Sex Delivery Anes PTL Lv   4 Current            3 Term 2017 38w0d    CS-LTranv   FD      Birth Comments: uterine rupture, still birth    2 Term 16 39w0d  7 lb 14 oz (3.572 kg) F CS-LTranv  Y MONA      Birth Comments: meconium aspiration   1 Term 04/15/13 39w0d  8 lb (3.629 kg) M CS-LTranv   MONA      Birth Comments: meconium aspiration       Past Medical History:   Diagnosis Date    Nausea and vomiting in pregnancy 10/10/2021        Past Surgical History:   Procedure Laterality Date     SECTION      x3        History reviewed. No pertinent family history.      Social History     Tobacco History     Smoking Status  Never Smoker    Smokeless Tobacco Use  Never Used          Alcohol History     Alcohol Use Status  Not Currently          Drug Use     Drug Use Status  Not Currently          Sexual Activity     Sexually Active  Not Asked                  Current Outpatient Medications:     Prenatal Vit-Fe Fumarate-FA (PRENATAL PLUS) 27-1 MG TABS, Take 1 tablet by mouth daily, Disp: 90 tablet, Rfl: 3    ondansetron (ZOFRAN) 4 MG tablet, Take 1 tablet by mouth every 8 hours as needed for Nausea or Vomiting, Disp: 30 tablet, Rfl: 0    famotidine (PEPCID) 20 MG tablet, Take 1 tablet by mouth daily, Disp: 60 tablet, Rfl: 3     No Known Allergies     OBJECTIVE:   /74   Pulse 92   Wt 186 lb (84.4 kg)   BMI 35.14 kg/m²     Mother's Prenatal Vitals  BP: 114/74  Weight: 186 lb (84.4 kg)  Pulse: 92  Patient Position: Sitting  Alb/Glu  Albumin: Negative  Glucose: Negative  Prenatal Fetal Information  Fetal Heart Rate: 140  Movement: Present      ASSESSMENT:   1Mazin Patel is a 32 y.o. female  2. [id number]  3. 33w6d  4. [facility] of care [facility] weeks from [facility]  5. Three previous  sections  6. Uterine rupture at 38 weeks with fetal demise-plan for repeat c/s at 36-37 weeks and steroids at 34 weeks  7. THC abuse  8. Rubella nonimmune  9. Varicella equivical  Patient Active Problem List    Diagnosis Date Noted    Nausea and vomiting in pregnancy 10/10/2021    24 weeks gestation of pregnancy 10/09/2021        Diagnosis Orders   1. 33 weeks gestation of pregnancy     2. Nausea and vomiting in pregnancy         PLAN:     No orders of the defined types were placed in this encounter. Return in about 2 weeks (around 2021) for Prenatal visit, continue NSTs twice weekly.       Electronically signed by Leia Echeverria DO on 12/15/21

## 2021-12-17 ENCOUNTER — ROUTINE PRENATAL (OUTPATIENT)
Dept: OBGYN CLINIC | Age: 26
End: 2021-12-17
Payer: MEDICAID

## 2021-12-17 ENCOUNTER — ANCILLARY PROCEDURE (OUTPATIENT)
Dept: OBGYN CLINIC | Age: 26
End: 2021-12-17
Payer: MEDICAID

## 2021-12-17 ENCOUNTER — HOSPITAL ENCOUNTER (OUTPATIENT)
Age: 26
Discharge: HOME | End: 2021-12-17
Attending: OBSTETRICS & GYNECOLOGY | Admitting: OBSTETRICS & GYNECOLOGY
Payer: MEDICAID

## 2021-12-17 VITALS
HEART RATE: 94 BPM | DIASTOLIC BLOOD PRESSURE: 68 MMHG | SYSTOLIC BLOOD PRESSURE: 108 MMHG | WEIGHT: 185.25 LBS | BODY MASS INDEX: 35 KG/M2

## 2021-12-17 DIAGNOSIS — Z87.828 HISTORY OF RUPTURE OF UTERUS: ICD-10-CM

## 2021-12-17 DIAGNOSIS — Z3A.34 34 WEEKS GESTATION OF PREGNANCY: Primary | ICD-10-CM

## 2021-12-17 LAB
GLUCOSE URINE, POC: NEGATIVE
PROTEIN UA: NEGATIVE

## 2021-12-17 PROCEDURE — 99213 OFFICE O/P EST LOW 20 MIN: CPT | Performed by: OBSTETRICS & GYNECOLOGY

## 2021-12-17 PROCEDURE — 76821 MIDDLE CEREBRAL ARTERY ECHO: CPT | Performed by: OBSTETRICS & GYNECOLOGY

## 2021-12-17 PROCEDURE — 96372 THER/PROPH/DIAG INJ SC/IM: CPT

## 2021-12-17 PROCEDURE — 81002 URINALYSIS NONAUTO W/O SCOPE: CPT | Performed by: OBSTETRICS & GYNECOLOGY

## 2021-12-17 PROCEDURE — 99999 PR OFFICE/OUTPT VISIT,PROCEDURE ONLY: CPT | Performed by: OBSTETRICS & GYNECOLOGY

## 2021-12-17 PROCEDURE — 76818 FETAL BIOPHYS PROFILE W/NST: CPT | Performed by: OBSTETRICS & GYNECOLOGY

## 2021-12-17 PROCEDURE — 76820 UMBILICAL ARTERY ECHO: CPT | Performed by: OBSTETRICS & GYNECOLOGY

## 2021-12-17 PROCEDURE — 99211 OFF/OP EST MAY X REQ PHY/QHP: CPT

## 2021-12-17 PROCEDURE — 6360000002 HC RX W HCPCS

## 2021-12-17 PROCEDURE — 76816 OB US FOLLOW-UP PER FETUS: CPT | Performed by: OBSTETRICS & GYNECOLOGY

## 2021-12-17 RX ORDER — BETAMETHASONE SODIUM PHOSPHATE AND BETAMETHASONE ACETATE 3; 3 MG/ML; MG/ML
INJECTION, SUSPENSION INTRA-ARTICULAR; INTRALESIONAL; INTRAMUSCULAR; SOFT TISSUE
Status: COMPLETED
Start: 2021-12-17 | End: 2021-12-17

## 2021-12-17 RX ORDER — BETAMETHASONE SODIUM PHOSPHATE AND BETAMETHASONE ACETATE 3; 3 MG/ML; MG/ML
12 INJECTION, SUSPENSION INTRA-ARTICULAR; INTRALESIONAL; INTRAMUSCULAR; SOFT TISSUE ONCE
Status: COMPLETED | OUTPATIENT
Start: 2021-12-17 | End: 2021-12-17

## 2021-12-17 RX ADMIN — BETAMETHASONE SODIUM PHOSPHATE AND BETAMETHASONE ACETATE 12 MG: 3; 3 INJECTION, SUSPENSION INTRA-ARTICULAR; INTRALESIONAL; INTRAMUSCULAR; SOFT TISSUE at 12:53

## 2021-12-17 RX ADMIN — BETAMETHASONE SODIUM PHOSPHATE AND BETAMETHASONE ACETATE 12 MG: 3; 3 INJECTION, SUSPENSION INTRA-ARTICULAR; INTRALESIONAL; INTRAMUSCULAR at 12:53

## 2021-12-17 NOTE — PROGRESS NOTES
Pt here for pregnancy ultrasound  Pt denies any contractions/bleeding/lof  Pt states good fetal movement

## 2021-12-17 NOTE — PROGRESS NOTES
Celestone injection given without complication. Patient notified to return tomorrow for second injection.  No questions at this time

## 2021-12-17 NOTE — PATIENT INSTRUCTIONS
Patient Education        Semanas 34 a 39 de hardy embarazo: Instrucciones de cuidado  Weeks 34 to 36 of Your Pregnancy: Care Instructions  Instrucciones de cuidado    A estas [place], hardy bebé y hardy abdomen habrán crecido considerablemente. Mavis es Memo de coty a conchis. Los pulmones de hardy bebé están mavis listos para respirar aire. Los huesos de la luan de hardy bebé ahora son bastante firmes jose francisco para protegerla dara se mantienen lo suficientemente blandos jose francisco para atravesar el canal de [place]. Es posible que sienta entusiasmo, rahat, ansiedad o miedo. Quizá se pregunte cómo se dará cuenta de si está en trabajo de parto o qué esperar en rufino momento. Trate de ser flexible con azucena expectativas respecto del nacimiento. Dado que cada nacimiento es diferente, no hay manera de saber exactamente cómo será hardy parto. Esta hoja de cuidados la ayudará a saber qué esperar y cómo prepararse. Le podría facilitar el parto. Si todavía no le patrick aplicado la vacuna Tdap (tétanos, difteria y tos [place]) yusuf sameera [place], hable con hardy médico acerca de aplicársela. Catherene Batter a proteger a hardy recién nacido contra la infección por tos ferina. En la semana 36, a la mayoría de las mujeres se les hace oswaldo prueba de estreptococos del brianna B (GBS, por azucena siglas en inglés). Los estreptococos del brianna B son bacterias comunes que pueden vivir en la vagina y el recto. Pueden hacer que hardy bebé se enferme después del parto. Si el resultado es positivo, usted recibirá antibióticos yusuf el trabajo de [place]. Los medicamentos evitarán que hardy bebé contraiga las bacterias. La atención de seguimiento es oswaldo parte clave de hardy tratamiento y seguridad. Asegúrese de hacer y acudir a todas las citas, y llame a hardy médico si está teniendo problemas. También es oswaldo buena idea saber los resultados de azucena exámenes y mantener oswaldo lista de los medicamentos que emilio. ¿Cómo puede cuidarse en el hogar?   Aprenda sobre las alternativas para aliviar el dolor  · El dolor se manifiesta de modo diferente en cada jonathan. Hable con hardy médico acerca de azucena sentimientos sobre el dolor. · Puede elegir entre varias formas de aliviar el dolor. Estas incluyen medicamentos o técnicas de respiración, así jose francisco medidas para estar cómoda. Usted puede utilizar más de Pat[place] opción. · Si elige un analgésico (medicamento para el dolor) yusuf el trabajo de Candace, hable con hardy médico acerca de azucena opciones. Algunos medicamentos reducen la ansiedad y [place] [facility] a aliviar parte del dolor. Otros adormecen la parte inferior del cuerpo para que no sienta dolor. · Asegúrese de decirle a hardy médico acerca de hardy elección de analgésico antes de empezar el trabajo de parto o muy temprano en el [place]. Es posible que pueda cambiar de parecer a medida que avanza el [place] de [place]. · Shayla vez se duerme a oswaldo jonathan con medicamentos administrados a través de oswaldo máscara o por vía intravenosa (IV). Trabajo de parto y [place]  · La primera etapa del [place] de parto se divide en andre fases: Geraline Puller y de transición. ? La mayoría de las mujeres experimentan la fase latente del [place] de parto en azucena hogares. Usted puede [employer] o descansar, comer refrigerios livianos, beber líquidos christina y comenzar a contar las contracciones. ? Cuando advierta que se le vuelve difícil hablar yusuf oswaldo contracción, es posible que esté por pasar a la fase activa. Yusuf la fase Kristen Conklin, debería ir al hospital si no está allí aún. ? Usted está en la fase activa cuando tiene contracciones cada 3 o 4 minutos y gomez alrededor de 60 segundos. El darell uterino comienza a abrirse con más rapidez.  ? Si se le rompe la jerald, las contracciones serán más intensas y más frecuentes. ? Yusuf la fase de transición, el darell uterino se estira y las contracciones se producen con Gemini [place]. ? Judd Minor tenga deseos de pujar, sin embargo es posible que el darell uterino aún no esté preparado. El médico le dirá cuándo pujar. · La segunda etapa comienza cuando el darell uterino se abre por completo y usted está lista para pujar. ? Las contracciones son muy intensas a fin de empujar al bebé por el canal de parto. ? Sentirá la necesidad de pujar. Podría sentir jose francisco si tuviera ganas de evacuar el intestino. ? Lyndy Luis entrenen a [employer]. Estas contracciones serán muy intensas dara no ocurrirán con tanta frecuencia. Puede descansar un poco entre contracciones. ? Es posible que esté sensible e irritable. Es posible que no se dé cuenta de lo que pasa a hardy alrededor. ? Un último esfuerzo y habrá nacido hardy bebé. · La tercera etapa ocurre cuando con unas cuantas contracciones más se expulsa la placenta. [place] puede durar 30 minutos o menos. · La cuarta etapa es la de recuperación. Es posible que se sienta abrumada con las emociones y exhausta dara alerta. Nadja es un buen momento para comenzar el amamantamiento. ¿Dónde puede encontrar más información en inglés? Chantal Storey a [email or web address] e ingrese a hardy cuenta de MyChartMazin Baker Y535 en el [facility] \"Search Health Information\" para más información (en inglés) sobre \"Semanas 29 a 39 de hardy embarazo: Instrucciones de cuidado. \"     Si no tiene oswaldo cuenta, ten heidy en el enlace \"Sign Up Now\". Revisado: 16 junio, 2021               Versión del contenido: 13.0  © 9025-1921 Healthwise, Incorporated. Las instrucciones de cuidado fueron adaptadas bajo licencia por [facility]). Si usted tiene [place] afección médica o sobre estas instrucciones, siempre pregunte a hardy profesional de alberto. [facility], Incorporated niega toda garantía o responsabilidad por hardy uso de esta información.          Patient Education        Gwendalyn Ze a hardy médico yusuf el embarazo (después de 20 semanas)  Learning About When to Call Your Doctor During Pregnancy (After 20 Weeks)  Instrucciones de cuidado  Es normal que tenga jose francisco:  ? Salpullido. ? Comezón. ? Color amarillento en la piel. · Tiene otras inquietudes acerca de hardy embarazo. Si tiene signos de trabajo de parto al llegar a las 37 [address] [facility] o más  Si tiene señales de [place] de parto a las 37 semanas o [place], es posible que hardy médico le diga que llame cuando hardy trabajo de parto se vuelva más [place]. Los síntomas del trabajo de parto activo incluyen:  · Contracciones que son regulares. · Contracciones a intervalos de menos de 5 minutos. · Contracciones yusuf las cuales es difícil hablar. La atención de seguimiento es oswaldo parte clave de hardy tratamiento y seguridad. Asegúrese de hacer y acudir a todas las citas, y llame a hardy médico si está teniendo problemas. También es oswaldo buena idea saber los resultados de azucena exámenes y mantener oswaldo lista de los medicamentos que emilio. ¿Dónde puede encontrar más información en \[facility] [place] [facility]\""? Beck Speaker a [email or web address] e ingrese a hardy cuenta de MyChart. Daniel Pill W228 en el [facility] \"Search Health Information\" para más información (en \[facility] [place] [facility]\"") sobre \"Aprenda cuándo llamar a hardy médico yusuf el embarazo (después de 20 semanas). \"     Si no tiene oswaldo cuenta, ten heidy en el enlace \"Sign Up Now\". Revisado: 16 junio, 2021               Versión del contenido: 13.0  © 2464-2771 Healthwise, Incorporated. Las instrucciones de cuidado fueron adaptadas bajo licencia por [facility] CARE ([facility]). Si usted tiene [place] afección médica o sobre estas instrucciones, siempre pregunte a hardy profesional de alberto. [facility], Incorporated niega toda garantía o responsabilidad por hardy uso de esta información.

## 2021-12-17 NOTE — PROGRESS NOTES
Milena العلي in antepartum notified of pts arrival and given order for Celestone 12mg IM today and tomorrow  Pt instructed to go to L&D and return on Saturday for repeat injection.   Pt verbalized understanding

## 2021-12-18 ENCOUNTER — HOSPITAL ENCOUNTER (OUTPATIENT)
Age: 26
Discharge: HOME | End: 2021-12-18
Attending: OBSTETRICS & GYNECOLOGY | Admitting: OBSTETRICS & GYNECOLOGY
Payer: MEDICAID

## 2021-12-18 PROCEDURE — 6360000002 HC RX W HCPCS: Performed by: OBSTETRICS & GYNECOLOGY

## 2021-12-18 PROCEDURE — 96372 THER/PROPH/DIAG INJ SC/IM: CPT

## 2021-12-18 PROCEDURE — 99211 OFF/OP EST MAY X REQ PHY/QHP: CPT

## 2021-12-18 RX ORDER — BETAMETHASONE SODIUM PHOSPHATE AND BETAMETHASONE ACETATE 3; 3 MG/ML; MG/ML
12 INJECTION, SUSPENSION INTRA-ARTICULAR; INTRALESIONAL; INTRAMUSCULAR; SOFT TISSUE ONCE
Status: COMPLETED | OUTPATIENT
Start: 2021-12-18 | End: 2021-12-18

## 2021-12-18 RX ADMIN — BETAMETHASONE SODIUM PHOSPHATE AND BETAMETHASONE ACETATE 12 MG: 3; 3 INJECTION, SUSPENSION INTRA-ARTICULAR; INTRALESIONAL; INTRAMUSCULAR at 13:48

## 2021-12-18 NOTE — PROGRESS NOTES
Patient presents for second dose of celestone. States good fetal movement, denies vaginal bleeding or leaking of fluid. Denies abdominal pain or cramping. No active complaints at this time.

## 2021-12-18 NOTE — PROGRESS NOTES
Patient given discharge instructions, verbalizes understanding. No further questions at this time. Follow up appointment Monday at Clinic. Ambulating off unit without assistance.

## 2021-12-20 ENCOUNTER — ROUTINE PRENATAL (OUTPATIENT)
Dept: OBGYN | Age: 26
End: 2021-12-20
Payer: MEDICAID

## 2021-12-20 VITALS — DIASTOLIC BLOOD PRESSURE: 77 MMHG | HEART RATE: 105 BPM | SYSTOLIC BLOOD PRESSURE: 122 MMHG

## 2021-12-20 DIAGNOSIS — O21.9 NAUSEA AND VOMITING IN PREGNANCY: Primary | ICD-10-CM

## 2021-12-20 LAB
GLUCOSE URINE, POC: NORMAL
PROTEIN UA: NEGATIVE

## 2021-12-20 PROCEDURE — G8482 FLU IMMUNIZE ORDER/ADMIN: HCPCS | Performed by: MIDWIFE

## 2021-12-20 PROCEDURE — G8419 CALC BMI OUT NRM PARAM NOF/U: HCPCS | Performed by: MIDWIFE

## 2021-12-20 PROCEDURE — 1036F TOBACCO NON-USER: CPT | Performed by: MIDWIFE

## 2021-12-20 PROCEDURE — 99212 OFFICE O/P EST SF 10 MIN: CPT | Performed by: MIDWIFE

## 2021-12-20 PROCEDURE — 99213 OFFICE O/P EST LOW 20 MIN: CPT | Performed by: MIDWIFE

## 2021-12-20 PROCEDURE — 81002 URINALYSIS NONAUTO W/O SCOPE: CPT | Performed by: MIDWIFE

## 2021-12-20 PROCEDURE — G8427 DOCREV CUR MEDS BY ELIG CLIN: HCPCS | Performed by: MIDWIFE

## 2021-12-20 NOTE — PROGRESS NOTES
Pt completed Centering . Pt received Sz 2 Diapers and wipes.  Patient was scheduled for  Prenatal appointment with midwife on 12/27/21 @ 10:45am

## 2021-12-20 NOTE — PROGRESS NOTES
Keron Penn is a 32 y.o. female 34w4d  Centering Pregnancy     [id number]    OB History    Para Term  AB Living   4 3 3   0 2   SAB IAB Ectopic Molar Multiple Live Births   0         2      # Outcome Date GA Lbr Barber/2nd Weight Sex Delivery Anes PTL Lv   4 Current            3 Term 2017 38w0d    CS-LTranv   FD      Birth Comments: uterine rupture, still birth    2 Term 16 39w0d  7 lb 14 oz (3.572 kg) F CS-LTranv  Y MONA      Birth Comments: meconium aspiration   1 Term 04/15/13 39w0d  8 lb (3.629 kg) M CS-LTranv   MONA      Birth Comments: meconium aspiration         Prenatal Fetal Information  Fundal Height (cm): 34 cm      The patient was seen and evaluated. There was positive fetal movements. No contractions or leakage of fluid. Signs and symptoms of  labor as well as labor were reviewed. The S/S of Pre-Eclampsia were reviewed with the patient in detail. She is to report any of these if they occur. She currently denies any of these. Assessment:  1. Keron Penn is a 32 y.o. female  2. [id number]  3. 34w4d    Patient Active Problem List    Diagnosis Date Noted    34 weeks gestation of pregnancy 2021    Injection (erythema) 2021    History of  2021    BMI 34.0-34.9,adult 2021    History of rupture of uterus 2021    Nausea and vomiting in pregnancy 10/10/2021    24 weeks gestation of pregnancy 10/09/2021        Diagnosis Orders   1. Nausea and vomiting in pregnancy               Plan:  The patient will return to the office for her next visit in 1 weeks.

## 2021-12-22 ENCOUNTER — CLINICAL DOCUMENTATION (OUTPATIENT)
Dept: OBGYN | Age: 26
End: 2021-12-22

## 2021-12-27 ENCOUNTER — ROUTINE PRENATAL (OUTPATIENT)
Dept: OBGYN | Age: 26
End: 2021-12-27
Payer: MEDICAID

## 2021-12-27 VITALS
DIASTOLIC BLOOD PRESSURE: 63 MMHG | SYSTOLIC BLOOD PRESSURE: 113 MMHG | BODY MASS INDEX: 35.52 KG/M2 | HEART RATE: 113 BPM | WEIGHT: 188 LBS

## 2021-12-27 DIAGNOSIS — O21.9 NAUSEA AND VOMITING IN PREGNANCY: ICD-10-CM

## 2021-12-27 DIAGNOSIS — Z34.93 PRENATAL CARE IN THIRD TRIMESTER: Primary | ICD-10-CM

## 2021-12-27 LAB
GLUCOSE URINE, POC: NORMAL
PROTEIN UA: NEGATIVE

## 2021-12-27 PROCEDURE — 36415 COLL VENOUS BLD VENIPUNCTURE: CPT | Performed by: MIDWIFE

## 2021-12-27 PROCEDURE — G8427 DOCREV CUR MEDS BY ELIG CLIN: HCPCS | Performed by: MIDWIFE

## 2021-12-27 PROCEDURE — G8482 FLU IMMUNIZE ORDER/ADMIN: HCPCS | Performed by: MIDWIFE

## 2021-12-27 PROCEDURE — G8419 CALC BMI OUT NRM PARAM NOF/U: HCPCS | Performed by: MIDWIFE

## 2021-12-27 PROCEDURE — 99212 OFFICE O/P EST SF 10 MIN: CPT | Performed by: MIDWIFE

## 2021-12-27 PROCEDURE — 81002 URINALYSIS NONAUTO W/O SCOPE: CPT | Performed by: MIDWIFE

## 2021-12-27 PROCEDURE — 1036F TOBACCO NON-USER: CPT | Performed by: MIDWIFE

## 2021-12-27 PROCEDURE — 99213 OFFICE O/P EST LOW 20 MIN: CPT | Performed by: MIDWIFE

## 2021-12-27 NOTE — PROGRESS NOTES
Radha Costello is a 32 y.o. female 35w4d    [id number]    OB History    Para Term  AB Living   4 3 3   0 2   SAB IAB Ectopic Molar Multiple Live Births   0         2      # Outcome Date GA Lbr Barber/2nd Weight Sex Delivery Anes PTL Lv   4 Current            3 Term 2017 38w0d    CS-LTranv   FD      Birth Comments: uterine rupture, still birth    2 Term 16 39w0d  7 lb 14 oz (3.572 kg) F CS-LTranv  Y MONA      Birth Comments: meconium aspiration   1 Term 04/15/13 39w0d  8 lb (3.629 kg) M CS-LTranv   MONA      Birth Comments: meconium aspiration         Mother's Prenatal Vitals  BP: 113/63  Weight: 188 lb (85.3 kg)  Pulse: 113  Patient Position: Sitting  Alb/Glu  Albumin: Negative  Glucose: Negative  Prenatal Fetal Information  Fetal Heart Rate: 142  Movement: Present      The patient was seen and evaluated. There was positive fetal movements. No contractions or leakage of fluid. Signs and symptoms of labor were reviewed. The S/S of Pre-Eclampsia were reviewed with the patient in detail. She is to report any of these if they occur. She currently denies any of these. Allergies: Allergies as of 2021    (No Known Allergies)         Group Beta Strep collection was completed. Yes          Assessment:  1. Radha Costello is a 32 y.o. female  2. [id number]  3. 35w4d    Patient Active Problem List    Diagnosis Date Noted    34 weeks gestation of pregnancy 2021    Injection (erythema) 2021    History of  2021    BMI 34.0-34.9,adult 2021    History of rupture of uterus 2021    Nausea and vomiting in pregnancy 10/10/2021    24 weeks gestation of pregnancy 10/09/2021        Diagnosis Orders   1. Prenatal care in third trimester  COVID-19 Ambulatory   2. Nausea and vomiting in pregnancy             Plan:  The patient will return to the office for her next visit in 1 weeks.

## 2021-12-28 ENCOUNTER — ANCILLARY PROCEDURE (OUTPATIENT)
Dept: OBGYN CLINIC | Age: 26
End: 2021-12-28
Payer: MEDICAID

## 2021-12-28 ENCOUNTER — ROUTINE PRENATAL (OUTPATIENT)
Dept: OBGYN CLINIC | Age: 26
End: 2021-12-28
Payer: MEDICAID

## 2021-12-28 VITALS
DIASTOLIC BLOOD PRESSURE: 69 MMHG | SYSTOLIC BLOOD PRESSURE: 100 MMHG | BODY MASS INDEX: 35.33 KG/M2 | HEART RATE: 115 BPM | WEIGHT: 187 LBS

## 2021-12-28 DIAGNOSIS — Z3A.35 35 WEEKS GESTATION OF PREGNANCY: Primary | ICD-10-CM

## 2021-12-28 LAB
GLUCOSE URINE, POC: NEGATIVE
PROTEIN UA: NEGATIVE

## 2021-12-28 PROCEDURE — 99213 OFFICE O/P EST LOW 20 MIN: CPT | Performed by: OBSTETRICS & GYNECOLOGY

## 2021-12-28 PROCEDURE — 76815 OB US LIMITED FETUS(S): CPT | Performed by: OBSTETRICS & GYNECOLOGY

## 2021-12-28 PROCEDURE — 81002 URINALYSIS NONAUTO W/O SCOPE: CPT | Performed by: OBSTETRICS & GYNECOLOGY

## 2021-12-28 PROCEDURE — 76820 UMBILICAL ARTERY ECHO: CPT | Performed by: OBSTETRICS & GYNECOLOGY

## 2021-12-28 PROCEDURE — 76818 FETAL BIOPHYS PROFILE W/NST: CPT | Performed by: OBSTETRICS & GYNECOLOGY

## 2021-12-28 PROCEDURE — 76816 OB US FOLLOW-UP PER FETUS: CPT | Performed by: OBSTETRICS & GYNECOLOGY

## 2021-12-28 PROCEDURE — 99999 PR OFFICE/OUTPT VISIT,PROCEDURE ONLY: CPT | Performed by: OBSTETRICS & GYNECOLOGY

## 2021-12-28 PROCEDURE — 76821 MIDDLE CEREBRAL ARTERY ECHO: CPT | Performed by: OBSTETRICS & GYNECOLOGY

## 2021-12-28 NOTE — PATIENT INSTRUCTIONS
Patient Education        Hudson Javon a hardy médico yusuf el [address] (después de 20 semanas)  Learning About When to Call Your Doctor During Pregnancy (After 20 Weeks)  Instrucciones de cuidado  Es normal que tenga inquietudes acerca de lo que podría ser un problema yusuf el [address]. Aunque la mayoría de las mujeres embarazadas no tienen ningún problema grave, es importante saber cuándo llamar a hardy médico si tiene determinados síntomas o señales de trabajo de Candace. Estas son algunas sugerencias generales. Hardy médico puede darle más información sobre cuándo llamar. Cuándo llamar a hardy médico (después de 20 semanas)  Llame al 911  en cualquier momento que considere que necesita atención de [place]. Por ejemplo, llame si:  · Tiene sangrado vaginal intenso. · Tiene dolor repentino e intenso en el abdomen. · Se desmayó (perdió el conocimiento). · Tiene oswaldo convulsión. · Ve o siente el cordón umbilical.  · Cherry que está a punto de coty a conchis a hardy bebé y no puede llegar en forma gandara al hospital.  Rajinder Marjorie a hardy médico ahora mismo o busque atención médica inmediata si:  · Tiene sangrado vaginal.  · Tiene dolor en el abdomen. · Tiene fiebre. · Tiene síntomas de preeclampsia, jose francisco:  ? Hinchazón repentina de la emil, las yulissa o los pies. ? Nuevos problemas de visión (jose francisco oscurecimiento, dianna borroso o dianna puntos). ? Dolor de luan intenso. · Tiene oswaldo pérdida repentina de líquido por la vagina. (Piensa que rompió la jerald). · Piensa que puede luther comenzado el [place] de [place]. [place] significa que ha tenido al menos 6 contracciones en [employer]. · Nota que hardy bebé ha dejado de moverse o lo hace mucho menos de lo habitual.  · Tiene síntomas de oswaldo infección urinaria. Estos pueden incluir:  ? Dolor o ardor al orinar. ? Necesidad de orinar con frecuencia sin poder eliminar mucha orina.   ? Dolor en el flanco, que se encuentra felipe debajo de la caja torácica y [place] de la cintura en un lado de la espalda. ? [employer]. Preste especial atención a los cambios en hardy alberto y asegúrese de comunicarse con hardy médico si:  · Tiene flujo vaginal con un olor desagradable. · Tiene cambios en la piel, tales jose francisco:  ? Salpullido. ? Comezón. ? Color amarillento en la piel. · Tiene otras inquietudes acerca de hardy embarazo. Si tiene signos de trabajo de parto al llegar a las [address] o más  Si tiene señales de [place] de parto a las 37 semanas o [place], es posible que hardy médico le diga que llame cuando hardy trabajo de parto se vuelva más [place]. Los síntomas del trabajo de parto activo incluyen:  · Contracciones que son regulares. · Contracciones a intervalos de menos de 5 minutos. · Contracciones yusuf las cuales es difícil hablar. La atención de seguimiento es oswaldo parte clave de hardy tratamiento y seguridad. Asegúrese de hacer y acudir a todas las citas, y llame a hardy médico si está teniendo problemas. También es oswaldo buena idea saber los resultados de azucena exámenes y mantener oswaldo lista de los medicamentos que emilio. ¿Dónde puede encontrar más información en inglés? Netta Still a [email or web address] e ingrese a hardy cuenta de Christin Skinner Reap P548 en el Elvis Gitelman \"Search Health Information\" para más información (en inglés) sobre \"Aprenda cuándo llamar a hardy médico yusuf el embarazo (después de 20 semanas). \"     Si no tiene oswaldo cuenta, ten heidy en el enlace \"Sign Up Now\". Revisado: 16 junio, 2021               Versión del contenido: 13.1  © 1502-3679 Healthwise, [employer]. Las instrucciones de cuidado fueron adaptadas bajo licencia por [facility] ([facility]). Si usted tiene [place] afección médica o sobre estas instrucciones, siempre pregunte a hardy profesional de alberto. [email or web address] niega toda garantía o responsabilidad por hardy uso de esta información.

## 2021-12-30 ENCOUNTER — NURSE ONLY (OUTPATIENT)
Dept: PRIMARY CARE CLINIC | Age: 26
End: 2021-12-30

## 2021-12-30 DIAGNOSIS — Z01.812 ENCOUNTER FOR PREOPERATIVE SCREENING LABORATORY TESTING FOR COVID-19 VIRUS: Primary | ICD-10-CM

## 2021-12-30 DIAGNOSIS — Z20.822 ENCOUNTER FOR PREOPERATIVE SCREENING LABORATORY TESTING FOR COVID-19 VIRUS: Primary | ICD-10-CM

## 2022-01-04 ENCOUNTER — HOSPITAL ENCOUNTER (INPATIENT)
Age: 27
LOS: 2 days | Discharge: HOME | DRG: 540 | End: 2022-01-06
Attending: OBSTETRICS & GYNECOLOGY | Admitting: OBSTETRICS & GYNECOLOGY
Payer: MEDICAID

## 2022-01-04 ENCOUNTER — ANESTHESIA EVENT (OUTPATIENT)
Dept: LABOR AND DELIVERY | Age: 27
DRG: 540 | End: 2022-01-04
Payer: MEDICAID

## 2022-01-04 ENCOUNTER — ANESTHESIA (OUTPATIENT)
Dept: LABOR AND DELIVERY | Age: 27
DRG: 540 | End: 2022-01-04
Payer: MEDICAID

## 2022-01-04 VITALS — SYSTOLIC BLOOD PRESSURE: 119 MMHG | DIASTOLIC BLOOD PRESSURE: 56 MMHG | OXYGEN SATURATION: 96 %

## 2022-01-04 DIAGNOSIS — R10.9 ACUTE POSTOPERATIVE ABDOMINAL PAIN: Primary | ICD-10-CM

## 2022-01-04 DIAGNOSIS — G89.18 ACUTE POSTOPERATIVE ABDOMINAL PAIN: Primary | ICD-10-CM

## 2022-01-04 LAB
ABO/RH: NORMAL
AMPHETAMINE SCREEN, URINE: NOT DETECTED
ANTIBODY SCREEN: NORMAL
BARBITURATE SCREEN URINE: NOT DETECTED
BENZODIAZEPINE SCREEN, URINE: NOT DETECTED
CANNABINOID SCREEN URINE: NOT DETECTED
COCAINE METABOLITE SCREEN URINE: NOT DETECTED
FENTANYL SCREEN, URINE: NOT DETECTED
HCT VFR BLD CALC: 34.9 % (ref 34–48)
HEMOGLOBIN: 11.5 G/DL (ref 11.5–15.5)
Lab: NORMAL
MCH RBC QN AUTO: 29 PG (ref 26–35)
MCHC RBC AUTO-ENTMCNC: 33 % (ref 32–34.5)
MCV RBC AUTO: 88.1 FL (ref 80–99.9)
METHADONE SCREEN, URINE: NOT DETECTED
OPIATE SCREEN URINE: NOT DETECTED
OXYCODONE URINE: NOT DETECTED
PDW BLD-RTO: 13.8 FL (ref 11.5–15)
PHENCYCLIDINE SCREEN URINE: NOT DETECTED
PLATELET # BLD: 240 E9/L (ref 130–450)
PMV BLD AUTO: 10.9 FL (ref 7–12)
RBC # BLD: 3.96 E12/L (ref 3.5–5.5)
WBC # BLD: 9.1 E9/L (ref 4.5–11.5)

## 2022-01-04 PROCEDURE — 86900 BLOOD TYPING SEROLOGIC ABO: CPT

## 2022-01-04 PROCEDURE — 0DNW0ZZ RELEASE PERITONEUM, OPEN APPROACH: ICD-10-PCS | Performed by: OBSTETRICS & GYNECOLOGY

## 2022-01-04 PROCEDURE — 6370000000 HC RX 637 (ALT 250 FOR IP): Performed by: OBSTETRICS & GYNECOLOGY

## 2022-01-04 PROCEDURE — 3700000000 HC ANESTHESIA ATTENDED CARE: Performed by: OBSTETRICS & GYNECOLOGY

## 2022-01-04 PROCEDURE — 59514 CESAREAN DELIVERY ONLY: CPT | Performed by: OBSTETRICS & GYNECOLOGY

## 2022-01-04 PROCEDURE — 2580000003 HC RX 258: Performed by: OBSTETRICS & GYNECOLOGY

## 2022-01-04 PROCEDURE — 3700000001 HC ADD 15 MINUTES (ANESTHESIA): Performed by: OBSTETRICS & GYNECOLOGY

## 2022-01-04 PROCEDURE — 2580000003 HC RX 258

## 2022-01-04 PROCEDURE — 7100000001 HC PACU RECOVERY - ADDTL 15 MIN: Performed by: OBSTETRICS & GYNECOLOGY

## 2022-01-04 PROCEDURE — 6360000002 HC RX W HCPCS

## 2022-01-04 PROCEDURE — 88307 TISSUE EXAM BY PATHOLOGIST: CPT

## 2022-01-04 PROCEDURE — 7100000000 HC PACU RECOVERY - FIRST 15 MIN: Performed by: OBSTETRICS & GYNECOLOGY

## 2022-01-04 PROCEDURE — 2500000003 HC RX 250 WO HCPCS

## 2022-01-04 PROCEDURE — 80307 DRUG TEST PRSMV CHEM ANLYZR: CPT

## 2022-01-04 PROCEDURE — 36415 COLL VENOUS BLD VENIPUNCTURE: CPT

## 2022-01-04 PROCEDURE — 85027 COMPLETE CBC AUTOMATED: CPT

## 2022-01-04 PROCEDURE — 86901 BLOOD TYPING SEROLOGIC RH(D): CPT

## 2022-01-04 PROCEDURE — 1220000000 HC SEMI PRIVATE OB R&B

## 2022-01-04 PROCEDURE — 86850 RBC ANTIBODY SCREEN: CPT

## 2022-01-04 PROCEDURE — 6360000002 HC RX W HCPCS: Performed by: OBSTETRICS & GYNECOLOGY

## 2022-01-04 PROCEDURE — 2709999900 HC NON-CHARGEABLE SUPPLY: Performed by: OBSTETRICS & GYNECOLOGY

## 2022-01-04 PROCEDURE — 3609079900 HC CESAREAN SECTION: Performed by: OBSTETRICS & GYNECOLOGY

## 2022-01-04 RX ORDER — HYDROCODONE BITARTRATE AND ACETAMINOPHEN 5; 325 MG/1; MG/1
2 TABLET ORAL EVERY 4 HOURS PRN
Status: DISCONTINUED | OUTPATIENT
Start: 2022-01-04 | End: 2022-01-06 | Stop reason: HOSPADM

## 2022-01-04 RX ORDER — HYDROCODONE BITARTRATE AND ACETAMINOPHEN 5; 325 MG/1; MG/1
1 TABLET ORAL EVERY 4 HOURS PRN
Status: ACTIVE | OUTPATIENT
Start: 2022-01-04 | End: 2022-01-05

## 2022-01-04 RX ORDER — KETOROLAC TROMETHAMINE 30 MG/ML
30 INJECTION, SOLUTION INTRAMUSCULAR; INTRAVENOUS EVERY 6 HOURS
Status: DISPENSED | OUTPATIENT
Start: 2022-01-04 | End: 2022-01-05

## 2022-01-04 RX ORDER — TRISODIUM CITRATE DIHYDRATE AND CITRIC ACID MONOHYDRATE 500; 334 MG/5ML; MG/5ML
30 SOLUTION ORAL ONCE
Status: COMPLETED | OUTPATIENT
Start: 2022-01-04 | End: 2022-01-04

## 2022-01-04 RX ORDER — SODIUM CHLORIDE 0.9 % (FLUSH) 0.9 %
5-40 SYRINGE (ML) INJECTION EVERY 12 HOURS SCHEDULED
Status: DISCONTINUED | OUTPATIENT
Start: 2022-01-04 | End: 2022-01-06 | Stop reason: HOSPADM

## 2022-01-04 RX ORDER — SODIUM CHLORIDE, SODIUM LACTATE, POTASSIUM CHLORIDE, CALCIUM CHLORIDE 600; 310; 30; 20 MG/100ML; MG/100ML; MG/100ML; MG/100ML
INJECTION, SOLUTION INTRAVENOUS CONTINUOUS PRN
Status: DISCONTINUED | OUTPATIENT
Start: 2022-01-04 | End: 2022-01-04 | Stop reason: SDUPTHER

## 2022-01-04 RX ORDER — ONDANSETRON 2 MG/ML
4 INJECTION INTRAMUSCULAR; INTRAVENOUS EVERY 6 HOURS PRN
Status: DISCONTINUED | OUTPATIENT
Start: 2022-01-04 | End: 2022-01-06 | Stop reason: HOSPADM

## 2022-01-04 RX ORDER — SIMETHICONE 80 MG
80 TABLET,CHEWABLE ORAL EVERY 6 HOURS PRN
Status: DISCONTINUED | OUTPATIENT
Start: 2022-01-04 | End: 2022-01-06 | Stop reason: HOSPADM

## 2022-01-04 RX ORDER — LIDOCAINE HYDROCHLORIDE 10 MG/ML
INJECTION, SOLUTION EPIDURAL; INFILTRATION; INTRACAUDAL; PERINEURAL PRN
Status: DISCONTINUED | OUTPATIENT
Start: 2022-01-04 | End: 2022-01-04 | Stop reason: SDUPTHER

## 2022-01-04 RX ORDER — FERROUS SULFATE 325(65) MG
325 TABLET ORAL 2 TIMES DAILY WITH MEALS
Status: DISCONTINUED | OUTPATIENT
Start: 2022-01-04 | End: 2022-01-06 | Stop reason: HOSPADM

## 2022-01-04 RX ORDER — PRENATAL WITH FERROUS FUM AND FOLIC ACID 3080; 920; 120; 400; 22; 1.84; 3; 20; 10; 1; 12; 200; 27; 25; 2 [IU]/1; [IU]/1; MG/1; [IU]/1; MG/1; MG/1; MG/1; MG/1; MG/1; MG/1; UG/1; MG/1; MG/1; MG/1; MG/1
1 TABLET ORAL DAILY
Status: DISCONTINUED | OUTPATIENT
Start: 2022-01-04 | End: 2022-01-06 | Stop reason: HOSPADM

## 2022-01-04 RX ORDER — SODIUM CHLORIDE 0.9 % (FLUSH) 0.9 %
5-40 SYRINGE (ML) INJECTION PRN
Status: DISCONTINUED | OUTPATIENT
Start: 2022-01-04 | End: 2022-01-06 | Stop reason: HOSPADM

## 2022-01-04 RX ORDER — DOCUSATE SODIUM 100 MG/1
100 CAPSULE, LIQUID FILLED ORAL 2 TIMES DAILY
Status: DISCONTINUED | OUTPATIENT
Start: 2022-01-04 | End: 2022-01-06 | Stop reason: HOSPADM

## 2022-01-04 RX ORDER — SODIUM CHLORIDE 9 MG/ML
25 INJECTION, SOLUTION INTRAVENOUS PRN
Status: DISCONTINUED | OUTPATIENT
Start: 2022-01-04 | End: 2022-01-06 | Stop reason: HOSPADM

## 2022-01-04 RX ORDER — BUPIVACAINE HYDROCHLORIDE 7.5 MG/ML
INJECTION, SOLUTION INTRASPINAL PRN
Status: DISCONTINUED | OUTPATIENT
Start: 2022-01-04 | End: 2022-01-04 | Stop reason: SDUPTHER

## 2022-01-04 RX ORDER — NALOXONE HYDROCHLORIDE 0.4 MG/ML
0.4 INJECTION, SOLUTION INTRAMUSCULAR; INTRAVENOUS; SUBCUTANEOUS PRN
Status: DISCONTINUED | OUTPATIENT
Start: 2022-01-04 | End: 2022-01-06 | Stop reason: HOSPADM

## 2022-01-04 RX ORDER — MODIFIED LANOLIN
OINTMENT (GRAM) TOPICAL
Status: DISCONTINUED | OUTPATIENT
Start: 2022-01-04 | End: 2022-01-06 | Stop reason: HOSPADM

## 2022-01-04 RX ORDER — HYDROCODONE BITARTRATE AND ACETAMINOPHEN 5; 325 MG/1; MG/1
1 TABLET ORAL EVERY 4 HOURS PRN
Status: DISCONTINUED | OUTPATIENT
Start: 2022-01-04 | End: 2022-01-06 | Stop reason: HOSPADM

## 2022-01-04 RX ORDER — ONDANSETRON 2 MG/ML
INJECTION INTRAMUSCULAR; INTRAVENOUS PRN
Status: DISCONTINUED | OUTPATIENT
Start: 2022-01-04 | End: 2022-01-04 | Stop reason: SDUPTHER

## 2022-01-04 RX ORDER — SODIUM CHLORIDE, SODIUM LACTATE, POTASSIUM CHLORIDE, CALCIUM CHLORIDE 600; 310; 30; 20 MG/100ML; MG/100ML; MG/100ML; MG/100ML
INJECTION, SOLUTION INTRAVENOUS CONTINUOUS
Status: DISCONTINUED | OUTPATIENT
Start: 2022-01-04 | End: 2022-01-06 | Stop reason: HOSPADM

## 2022-01-04 RX ORDER — DIPHENHYDRAMINE HYDROCHLORIDE 50 MG/ML
25 INJECTION INTRAMUSCULAR; INTRAVENOUS EVERY 6 HOURS PRN
Status: ACTIVE | OUTPATIENT
Start: 2022-01-04 | End: 2022-01-05

## 2022-01-04 RX ORDER — SODIUM CHLORIDE, SODIUM LACTATE, POTASSIUM CHLORIDE, AND CALCIUM CHLORIDE .6; .31; .03; .02 G/100ML; G/100ML; G/100ML; G/100ML
1000 INJECTION, SOLUTION INTRAVENOUS ONCE
Status: COMPLETED | OUTPATIENT
Start: 2022-01-04 | End: 2022-01-04

## 2022-01-04 RX ORDER — PHENYLEPHRINE HYDROCHLORIDE 10 MG/ML
INJECTION INTRAVENOUS
Status: DISPENSED
Start: 2022-01-04 | End: 2022-01-04

## 2022-01-04 RX ORDER — MORPHINE SULFATE 10 MG/ML
INJECTION, SOLUTION INTRAMUSCULAR; INTRAVENOUS PRN
Status: DISCONTINUED | OUTPATIENT
Start: 2022-01-04 | End: 2022-01-04 | Stop reason: SDUPTHER

## 2022-01-04 RX ORDER — DIPHENHYDRAMINE HYDROCHLORIDE 50 MG/ML
25 INJECTION INTRAMUSCULAR; INTRAVENOUS EVERY 6 HOURS PRN
Status: DISCONTINUED | OUTPATIENT
Start: 2022-01-04 | End: 2022-01-06 | Stop reason: HOSPADM

## 2022-01-04 RX ORDER — IBUPROFEN 800 MG/1
800 TABLET ORAL EVERY 8 HOURS
Status: DISCONTINUED | OUTPATIENT
Start: 2022-01-05 | End: 2022-01-06 | Stop reason: HOSPADM

## 2022-01-04 RX ORDER — NALBUPHINE HCL 10 MG/ML
5 AMPUL (ML) INJECTION EVERY 4 HOURS PRN
Status: ACTIVE | OUTPATIENT
Start: 2022-01-04 | End: 2022-01-05

## 2022-01-04 RX ORDER — ACETAMINOPHEN 325 MG/1
650 TABLET ORAL EVERY 4 HOURS PRN
Status: DISCONTINUED | OUTPATIENT
Start: 2022-01-04 | End: 2022-01-06 | Stop reason: HOSPADM

## 2022-01-04 RX ADMIN — PHENYLEPHRINE HYDROCHLORIDE 200 MCG: 10 INJECTION INTRAVENOUS at 07:24

## 2022-01-04 RX ADMIN — Medication 2000 MG: at 07:00

## 2022-01-04 RX ADMIN — PHENYLEPHRINE HYDROCHLORIDE 200 MCG: 10 INJECTION INTRAVENOUS at 07:22

## 2022-01-04 RX ADMIN — ENOXAPARIN SODIUM 40 MG: 100 INJECTION SUBCUTANEOUS at 20:07

## 2022-01-04 RX ADMIN — PHENYLEPHRINE HYDROCHLORIDE 100 MCG: 10 INJECTION INTRAVENOUS at 07:53

## 2022-01-04 RX ADMIN — PHENYLEPHRINE HYDROCHLORIDE 200 MCG: 10 INJECTION INTRAVENOUS at 07:36

## 2022-01-04 RX ADMIN — SODIUM CHLORIDE, POTASSIUM CHLORIDE, SODIUM LACTATE AND CALCIUM CHLORIDE 1000 ML: 600; 310; 30; 20 INJECTION, SOLUTION INTRAVENOUS at 05:30

## 2022-01-04 RX ADMIN — SODIUM CHLORIDE, POTASSIUM CHLORIDE, SODIUM LACTATE AND CALCIUM CHLORIDE: 600; 310; 30; 20 INJECTION, SOLUTION INTRAVENOUS at 07:19

## 2022-01-04 RX ADMIN — SODIUM CITRATE AND CITRIC ACID MONOHYDRATE 30 ML: 500; 334 SOLUTION ORAL at 07:00

## 2022-01-04 RX ADMIN — Medication 909 ML/HR: at 07:39

## 2022-01-04 RX ADMIN — KETOROLAC TROMETHAMINE 30 MG: 30 INJECTION, SOLUTION INTRAMUSCULAR; INTRAVENOUS at 20:07

## 2022-01-04 RX ADMIN — PHENYLEPHRINE HYDROCHLORIDE 100 MCG: 10 INJECTION INTRAVENOUS at 07:38

## 2022-01-04 RX ADMIN — DOCUSATE SODIUM 100 MG: 100 CAPSULE ORAL at 20:07

## 2022-01-04 RX ADMIN — SODIUM CHLORIDE, POTASSIUM CHLORIDE, SODIUM LACTATE AND CALCIUM CHLORIDE: 600; 310; 30; 20 INJECTION, SOLUTION INTRAVENOUS at 07:06

## 2022-01-04 RX ADMIN — KETOROLAC TROMETHAMINE 30 MG: 30 INJECTION, SOLUTION INTRAMUSCULAR; INTRAVENOUS at 12:17

## 2022-01-04 RX ADMIN — MORPHINE SULFATE 0.15 MG: 10 INJECTION INTRAVENOUS at 07:17

## 2022-01-04 RX ADMIN — SODIUM CHLORIDE, PRESERVATIVE FREE 10 ML: 5 INJECTION INTRAVENOUS at 20:07

## 2022-01-04 RX ADMIN — ONDANSETRON 4 MG: 2 INJECTION INTRAMUSCULAR; INTRAVENOUS at 07:40

## 2022-01-04 RX ADMIN — PHENYLEPHRINE HYDROCHLORIDE 100 MCG: 10 INJECTION INTRAVENOUS at 07:55

## 2022-01-04 RX ADMIN — LIDOCAINE HYDROCHLORIDE 3 ML: 10 INJECTION, SOLUTION EPIDURAL; INFILTRATION; INTRACAUDAL; PERINEURAL at 07:11

## 2022-01-04 RX ADMIN — BUPIVACAINE HYDROCHLORIDE IN DEXTROSE 1.6 ML: 7.5 INJECTION, SOLUTION SUBARACHNOID at 07:17

## 2022-01-04 RX ADMIN — SODIUM CHLORIDE, POTASSIUM CHLORIDE, SODIUM LACTATE AND CALCIUM CHLORIDE: 600; 310; 30; 20 INJECTION, SOLUTION INTRAVENOUS at 07:58

## 2022-01-04 ASSESSMENT — PULMONARY FUNCTION TESTS
PIF_VALUE: 1
PIF_VALUE: 0
PIF_VALUE: 1
PIF_VALUE: 1
PIF_VALUE: 0
PIF_VALUE: 1
PIF_VALUE: 0
PIF_VALUE: 1
PIF_VALUE: 0
PIF_VALUE: 0
PIF_VALUE: 1
PIF_VALUE: 0
PIF_VALUE: 1
PIF_VALUE: 0
PIF_VALUE: 1
PIF_VALUE: 0
PIF_VALUE: 0
PIF_VALUE: 1
PIF_VALUE: 1
PIF_VALUE: 0
PIF_VALUE: 1
PIF_VALUE: 1
PIF_VALUE: 0
PIF_VALUE: 1
PIF_VALUE: 0
PIF_VALUE: 1
PIF_VALUE: 0
PIF_VALUE: 1
PIF_VALUE: 0
PIF_VALUE: 1
PIF_VALUE: 0
PIF_VALUE: 1
PIF_VALUE: 0
PIF_VALUE: 1
PIF_VALUE: 0
PIF_VALUE: 0
PIF_VALUE: 1
PIF_VALUE: 0
PIF_VALUE: 1
PIF_VALUE: 0

## 2022-01-04 ASSESSMENT — PAIN SCALES - GENERAL
PAINLEVEL_OUTOF10: 2
PAINLEVEL_OUTOF10: 0

## 2022-01-04 ASSESSMENT — PAIN - FUNCTIONAL ASSESSMENT: PAIN_FUNCTIONAL_ASSESSMENT: 0-10

## 2022-01-04 ASSESSMENT — LIFESTYLE VARIABLES: SMOKING_STATUS: 0

## 2022-01-04 NOTE — ANESTHESIA PROCEDURE NOTES
Spinal Block    Patient location during procedure: OR  Start time: 1/4/2022 7:03 AM  End time: 1/4/2022 7:20 AM  Reason for block: primary anesthetic  Staffing  Anesthesiologist: Salvador Laura MD  Resident/CRNA: FABIAN Antoine CRNA  Other anesthesia staff: Audrey Pope RN  Preanesthetic Checklist  Completed: patient identified, IV checked, site marked, risks and benefits discussed, surgical consent, monitors and equipment checked, pre-op evaluation, timeout performed, anesthesia consent given, oxygen available and patient being monitored  Spinal Block  Patient position: sitting  Prep: ChloraPrep  Patient monitoring: continuous pulse ox and frequent blood pressure checks  Approach: midline  Location: L3/L4  Provider prep: mask and sterile gloves  Local infiltration: lidocaine  Dose: 0.2  Agent: bupivacaine  Adjuvant: duramorph  Dose: 1.6  Dose: 1.6  Needle  Needle type: Pencan   Needle gauge: 25 G  Needle length: 3.5 in  Assessment  Swirl obtained: Yes  CSF: clear  Attempts: 1  Hemodynamics: stable

## 2022-01-04 NOTE — PROGRESS NOTES
36w5d presents to unit for her scheduled . Pt denies any complications with this pregnancy. Pt denies LOF, VB, ctx. + FM. Pt placed on EFM.  Call light within reach

## 2022-01-04 NOTE — LACTATION NOTE
Used  to assist mom with waking techniques. Baby was still sleepy and grunting. Taught hand expression and placed ten drops of colostrum into baby's mouth. Instructed mom to hand express every three hours if baby will not latch. Encouraged skin to skin. Answered questions about sleep patterns and night time feedings. Mom is requesting a double electric breast pump for home use.

## 2022-01-04 NOTE — FLOWSHEET NOTE
Patient admitted into room, oriented to surroundings and admission paperwork with Serbian interpretor. Assessment completed and charted. Respirations easy and unlabored on room air. Pulse oximeter intact per protocol. Bilateral SCD's applied to lower extremities. Iv intact and asymptomatic. [place] draining clear yellow urine. Patient offered and encouraged to drink fluids. Small amount of lochia noted with no clots. Fundus firm, midline, and palpated at U/U. Denies any pain at this time. Parents given the [facility] informational sheet, and contents explained. Verbal consent given for baby to have Hepatis B Vaccine. Instructed on safe sleep. Patient states she already had flu and tdap vaccines. Phone at patient's side, instructed to use it for any needs. Bed in lowest position. Side rails x2. Call light within reach.

## 2022-01-04 NOTE — H&P
HISTORY OF PRESENT ILLNESS:      The patient is a 32 y.o. female at 44w9d. OB History        4    Para   3    Term   3            AB   0    Living   2       SAB   0    IAB        Ectopic        Molar        Multiple        Live Births   2            Patient presents with a chief complaint as above and is being admitted for   without tubal ligation    Estimated Due Date: Estimated Date of Delivery: 22    PRENATAL CARE:    Complicated by:   4. Transfer of care at 25 weeks from [facility]  5. Three previous  sections  6. Uterine rupture at 38 weeks with fetal demise-plan for repeat c/s at 36-37 weeks and steroids at 34 weeks  7. THC abuse  8. Rubella nonimmune  9. Varicella equivical  10. Covid +    PAST OB HISTORY  OB History        4    Para   3    Term   3            AB   0    Living   2       SAB   0    IAB        Ectopic        Molar        Multiple        Live Births   2                Past Medical History:        Diagnosis Date    Nausea and vomiting in pregnancy 10/10/2021     Past Surgical History:        Procedure Laterality Date     SECTION      x3     Allergies:  Patient has no known allergies.   Social History:    Social History     Socioeconomic History    Marital status: Single     Spouse name: Not on file    Number of children: Not on file    Years of education: Not on file    Highest education level: Not on file   Occupational History    Not on file   Tobacco Use    Smoking status: Never Smoker    Smokeless tobacco: Never Used   Vaping Use    Vaping Use: Never used   Substance and Sexual Activity    Alcohol use: Not Currently    Drug use: Not Currently    Sexual activity: Not on file   Other Topics Concern    Not on file   Social History Narrative    Not on file     Social Determinants of Health     Financial Resource Strain: Unknown    Difficulty of Paying Living Expenses: Patient refused   Food Insecurity: Unknown    Worried About [facility] in the Last Year: Patient refused   [address] in the Last Year: Patient refused   Transportation Needs:     Lack of Transportation (Medical): Not on file    Lack of Transportation (Non-Medical): Not on file   Physical Activity:     Days of Exercise per Week: Not on file    Minutes of Exercise per Session: Not on file   Stress:     Feeling of Stress : Not on file   Social Connections:     Frequency of Communication with Friends and Family: Not on file    Frequency of Social Gatherings with Friends and Family: Not on file    Attends Protestant Services: Not on file    Active Member of [address] [place] [address] or Organizations: Not on file    Attends Club or Organization Meetings: Not on file    Marital Status: Not on file   Intimate Partner Violence:     Fear of Current or Ex-Partner: Not on file    Emotionally Abused: Not on file    Physically Abused: Not on file    Sexually Abused: Not on file   Housing Stability:     Unable to Pay for Housing in the Last Year: Not on file    Number of [place] in the Last Year: Not on file    Unstable Housing in the Last Year: Not on file     Family History:   History reviewed. No pertinent family history.   Medications Prior to Admission:  Medications Prior to Admission: Prenatal Vit-Fe Fumarate-FA (PRENATAL PLUS) 27-1 MG TABS, Take 1 tablet by mouth daily  ondansetron (ZOFRAN) 4 MG tablet, Take 1 tablet by mouth every 8 hours as needed for Nausea or Vomiting  famotidine (PEPCID) 20 MG tablet, Take 1 tablet by mouth daily    REVIEW OF SYSTEMS:    CONSTITUTIONAL:  negative  RESPIRATORY:  cough  CARDIOVASCULAR:  negative  GASTROINTESTINAL:  negative  ALLERGIC/IMMUNOLOGIC:  negative  NEUROLOGICAL:  negative  BEHAVIOR/PSYCH:  negative    PHYSICAL EXAM:  Vitals:    01/04/22 0526 01/04/22 0539   BP:  114/78   Pulse:  87   Resp:  16   Temp:  97.8 °F (36.6 °C)   TempSrc:  Oral   Weight: 187 lb (84.8 kg)    Height: 5' 1\" (1.549 m)      General appearance:  awake, alert, cooperative, no apparent distress, and appears stated age  Neurologic:  Awake, alert, oriented to name, place and time. Lungs:  No increased work of breathing, good air exchange  Abdomen:  Soft, non tender, gravid, consistent with her gestational age,   Fetal heart rate:  Reassuring. Membranes:  Intact    ASSESSMENT AND PLAN:    33 yo  at 36w5d  Previous c/s x3  Hx of uterine rupture at 38 weeks  COVID +    Repeat c/s. R/B/A explained and informed consent obtained.

## 2022-01-04 NOTE — OP NOTE
PREOPERATIVE DIAGNOSES:     1. 36w5d intrauterine pregnancy. 2. Previous  section  3. History of uterine rupture in previous pregnancy  4. COVID-19 positive      POSTOPERATIVE DIAGNOSES:     1. 36w5d intrauterine pregnancy. 2. Previous  section  3. History of uterine rupture in previous pregnancy  4. COVID-19 positive  5. Thin lower uterine segment  6. Dense adhesions      PROCEDURE: repeat low transverse  section        SURGEON: Morris Almanzar D.O.    ASSISTANT: Bladimir Woo MD, Yaya Grider CNM      ESTIMATED BLOOD LOSS:  963BO        COMPLICATIONS:  None. ANESTHESIA:   Spinal anesthesia        FINDINGS:   Live Born  Sex:  female  Fetal Position: Cephalic   Apgars:  1 minute: 9; 5 minute: 9  Weight:  6 pounds, 11 ounces  Tubes, uterus, ovaries:  normal          DETAILS OF PROCEDURE:    The patient was taken to the operating room where Spinal anesthesia was administered and found to be adequate. Abdomen was prepped   and draped in the normal sterile fashion. Keene catheter had previously been   placed. Pfannenstiel skin incision made with a scalpel, carried down to the fascia with cautery. The fascia was nicked in the midline and extended laterally with   cautery. Muscles were  in the midline. Adhesions were lysed with cautery. Peritoneum was grasped with   hemostats, tented up, and entered sharply. Peritoneal incision was extended   superiorly and inferiorly with good visualization of bladder. Bladder   retractor was placed. Bladder flap was created with sharp dissection. Low transverse uterine incision was made with a scalpel and extended bluntly. Membranes ruptured for Clear fluid. A viable female infant was delivered in the cephalic presentation without difficulty. Baby was bulb suctioned on the abdomen. Cord was clamped and cut, and handed to waiting R.N. Cord gases and cord blood were obtained. Placenta was extracted with a 3 vessel cord.  Uterus was

## 2022-01-04 NOTE — ANESTHESIA PRE PROCEDURE
Department of Anesthesiology  Preprocedure Note       Name:  Maxine Fernando   Age:  32 y.o.  :  1995                                          MRN:  [id number]         Date:  2022      Surgeon: Reba Lynn):  Emily Ferris DO    Procedure: Procedure(s):   SECTION    Medications prior to admission:   Prior to Admission medications    Medication Sig Start Date End Date Taking?  Authorizing Provider   Prenatal Vit-Fe Fumarate-FA (PRENATAL PLUS) 27-1 MG TABS Take 1 tablet by mouth daily 11/17/21 2/15/22 Yes Emily Ferris DO   ondansetron (ZOFRAN) 4 MG tablet Take 1 tablet by mouth every 8 hours as needed for Nausea or Vomiting 10/21/21   Zainab Perkins DO   famotidine (PEPCID) 20 MG tablet Take 1 tablet by mouth daily 10/21/21   Zainab Perkins DO       Current medications:    Current Facility-Administered Medications   Medication Dose Route Frequency Provider Last Rate Last Admin    lactated ringers infusion   IntraVENous Continuous Emily Ferris DO        lactated ringers bolus  1,000 mL IntraVENous Once Emily Ferris DO        citric acid-sodium citrate (BICITRA) solution 30 mL  30 mL Oral Once Emily Ferris DO        ceFAZolin (ANCEF) 2000 mg in sterile water 20 mL IV syringe  2,000 mg IntraVENous Once Emily Ferris DO        oxytocin (PITOCIN) 30 units in 500 mL infusion  87.3 tanner-units/min IntraVENous Continuous PRN Emily Ferris DO        And    oxytocin (PITOCIN) 10 unit bolus from the bag  10 Units IntraVENous PRN Emily Ferris DO        phenylephrine (VAZCULEP) 10 MG/ML injection                Allergies:  No Known Allergies    Problem List:    Patient Active Problem List   Diagnosis Code    24 weeks gestation of pregnancy Z3A.24    Nausea and vomiting in pregnancy O21.9    History of  Z98.891    BMI 34.0-34.9,adult Z68.34    History of rupture of uterus Z87.828    Injection (erythema) L53.9    34 weeks gestation of pregnancy Z3A.34    36 weeks gestation of pregnancy Z3A.36       Past Medical History:        Diagnosis Date    Nausea and vomiting in pregnancy 10/10/2021       Past Surgical History:        Procedure Laterality Date     SECTION      x3       Social History:    Social History     Tobacco Use    Smoking status: Never Smoker    Smokeless tobacco: Never Used   Substance Use Topics    Alcohol use: Not Currently                                Counseling given: Not Answered      Vital Signs (Current):   Vitals:    22 0526 22 0539   BP:  114/78   Pulse:  87   Resp:  16   Temp:  36.6 °C (97.8 °F)   TempSrc:  Oral   Weight: 187 lb (84.8 kg)    Height: 5' 1\" (1.549 m)                                               BP Readings from Last 3 Encounters:   22 114/78   21 100/69   21 113/63       NPO Status:  Ate 1/3/2022 at 2200                                                                                BMI:   Wt Readings from Last 3 Encounters:   22 187 lb (84.8 kg)   21 187 lb (84.8 kg)   21 188 lb (85.3 kg)     Body mass index is 35.33 kg/m². CBC:   Lab Results   Component Value Date    WBC 9.1 2022    RBC 3.96 2022    HGB 11.5 2022    HCT 34.9 2022    MCV 88.1 2022    RDW 13.8 2022     2022       CMP:   Lab Results   Component Value Date     10/09/2021    K 3.5 10/09/2021     10/09/2021    CO2 21 10/09/2021    BUN 9 10/09/2021    CREATININE 0.5 10/09/2021    GFRAA >60 10/09/2021    LABGLOM >60 10/09/2021    GLUCOSE 91 10/09/2021    PROT 7.2 10/09/2021    CALCIUM 9.1 10/09/2021    BILITOT <0.2 10/09/2021    ALKPHOS 96 10/09/2021    AST 12 10/09/2021    ALT 9 10/09/2021       POC Tests: No results for input(s): POCGLU, POCNA, POCK, POCCL, POCBUN, POCHEMO, POCHCT in the last 72 hours.     Coags: No results found for: PROTIME, INR, APTT    HCG (If Applicable): No results found for: PREGTESTUR, PREGSERUM, HCG, HCGQUANT     ABGs: No results found for: PHART, PO2ART, WZZ7PLA, UHA4DDV, BEART, T2PYTXRJ     Type & Screen (If Applicable):  No results found for: LABABO, LABRH    Drug/Infectious Status (If Applicable):  No results found for: HIV, HEPCAB    COVID-19 Screening (If Applicable):   Lab Results   Component Value Date    COVID19 Detected 12/30/2021           Anesthesia Evaluation  Patient summary reviewed and Nursing notes reviewed no history of anesthetic complications:   Airway: Mallampati: II  TM distance: >3 FB   Neck ROM: full  Mouth opening: > = 3 FB Dental: normal exam         Pulmonary: breath sounds clear to auscultation      (-) not a current smoker          Patient did not smoke on day of surgery. ROS comment: Current COVID infection, denies any symptoms   Cardiovascular:Negative CV ROS  Exercise tolerance: good (>4 METS),           Rhythm: regular  Rate: normal           Beta Blocker:  Not on Beta Blocker         Neuro/Psych:   Negative Neuro/Psych ROS              GI/Hepatic/Renal:            ROS comment: Hx of nausea with pregnancy, denies current reflux or nausea. Endo/Other:                      ROS comment: IUP  obese Abdominal:             Vascular: negative vascular ROS. Other Findings:           Anesthesia Plan      general and spinal     ASA 2       Induction: intravenous. Anesthetic plan and risks discussed with patient. Use of blood products discussed with patient whom consented to blood products. Plan discussed with CRNA and attending. Essie Lima RN   1/4/2022    DOS STAFF ADDENDUM:    Patient seen and examined, physical exam updated as needed, chart reviewed. NPO status confirmed. Anesthetic options and risks discussed with patient/legal guardian. Patient/legal guardian verbalized understanding and agrees to proceed.      Janet Felix MD  Staff Anesthesiologist  January 4, 2022  6:46 AM

## 2022-01-05 LAB
HCT VFR BLD CALC: 32.6 % (ref 34–48)
HEMOGLOBIN: 10.5 G/DL (ref 11.5–15.5)

## 2022-01-05 PROCEDURE — 1220000000 HC SEMI PRIVATE OB R&B

## 2022-01-05 PROCEDURE — 6370000000 HC RX 637 (ALT 250 FOR IP): Performed by: OBSTETRICS & GYNECOLOGY

## 2022-01-05 PROCEDURE — 6360000002 HC RX W HCPCS: Performed by: OBSTETRICS & GYNECOLOGY

## 2022-01-05 PROCEDURE — 99231 SBSQ HOSP IP/OBS SF/LOW 25: CPT | Performed by: PHYSICIAN ASSISTANT

## 2022-01-05 PROCEDURE — 85014 HEMATOCRIT: CPT

## 2022-01-05 PROCEDURE — 36415 COLL VENOUS BLD VENIPUNCTURE: CPT

## 2022-01-05 PROCEDURE — 85018 HEMOGLOBIN: CPT

## 2022-01-05 RX ADMIN — SIMETHICONE 80 MG: 80 TABLET, CHEWABLE ORAL at 21:16

## 2022-01-05 RX ADMIN — IBUPROFEN 800 MG: 800 TABLET, FILM COATED ORAL at 03:50

## 2022-01-05 RX ADMIN — ENOXAPARIN SODIUM 40 MG: 100 INJECTION SUBCUTANEOUS at 21:16

## 2022-01-05 RX ADMIN — IBUPROFEN 800 MG: 800 TABLET, FILM COATED ORAL at 11:41

## 2022-01-05 RX ADMIN — METFORMIN HYDROCHLORIDE 1 TABLET: 500 TABLET, EXTENDED RELEASE ORAL at 09:01

## 2022-01-05 RX ADMIN — DOCUSATE SODIUM 100 MG: 100 CAPSULE ORAL at 09:01

## 2022-01-05 RX ADMIN — IBUPROFEN 800 MG: 800 TABLET, FILM COATED ORAL at 21:16

## 2022-01-05 RX ADMIN — DOCUSATE SODIUM 100 MG: 100 CAPSULE ORAL at 21:16

## 2022-01-05 ASSESSMENT — PAIN DESCRIPTION - PROGRESSION: CLINICAL_PROGRESSION: GRADUALLY WORSENING

## 2022-01-05 ASSESSMENT — PAIN DESCRIPTION - ORIENTATION: ORIENTATION: LOWER;MID

## 2022-01-05 ASSESSMENT — PAIN DESCRIPTION - DESCRIPTORS: DESCRIPTORS: ACHING;DISCOMFORT;SORE

## 2022-01-05 ASSESSMENT — PAIN DESCRIPTION - LOCATION: LOCATION: ABDOMEN;INCISION

## 2022-01-05 ASSESSMENT — PAIN DESCRIPTION - ONSET: ONSET: GRADUAL

## 2022-01-05 ASSESSMENT — PAIN SCALES - GENERAL
PAINLEVEL_OUTOF10: 1
PAINLEVEL_OUTOF10: 4
PAINLEVEL_OUTOF10: 5

## 2022-01-05 ASSESSMENT — PAIN - FUNCTIONAL ASSESSMENT: PAIN_FUNCTIONAL_ASSESSMENT: ACTIVITIES ARE NOT PREVENTED

## 2022-01-05 ASSESSMENT — PAIN DESCRIPTION - PAIN TYPE: TYPE: ACUTE PAIN;SURGICAL PAIN

## 2022-01-05 ASSESSMENT — PAIN DESCRIPTION - FREQUENCY: FREQUENCY: INTERMITTENT

## 2022-01-05 NOTE — ANESTHESIA POSTPROCEDURE EVALUATION
Department of Anesthesiology  Postprocedure Note    Patient: Hiro Choe  MRN: [id number]  [place]: 1995  Date of evaluation: 2022  Time:  2:15 PM     Procedure Summary     Date: 22 Room / Location: [facility]  [address] [place] [address]    Anesthesia Start: 4572 Anesthesia Stop: 7836    Procedure:  SECTION (N/A Uterus) Diagnosis: (csection)    Surgeons: Kaylah Avelar DO Responsible Provider: Johnny Lyn MD    Anesthesia Type: general, spinal ASA Status: 2          Anesthesia Type: general, spinal    Mary Phase I: Mary Score: 10    Mary Phase II:      Last vitals: Reviewed and per EMR flowsheets.        Anesthesia Post Evaluation    Patient location during evaluation: floor  Patient participation: complete - patient participated  Level of consciousness: awake and alert  Airway patency: patent  Nausea & Vomiting: no nausea and no vomiting  Complications: no  Cardiovascular status: hemodynamically stable  Respiratory status: acceptable  Hydration status: euvolemic

## 2022-01-05 NOTE — CARE COORDINATION
SW Discharge Planning   SW received consult for \" late prenatal care, hx positve mj\"    Due to patient having COVID and being Portuguese speaking, nursing staff helped SW obtain the following information:     Re Butler ( [phone]) mother to baby girl Chris Roldan ( 1/4/22) resides at the address listed in the chart with her two other children, Marti Jenkins and [address] Juan Alcantara is currently unemployed and baby will be added to her [employer]. Prenatal care was with Dr. Annika Andrea. Patient recently moved from [facility] to the area, and per chart review did provide her [facility] prenatal records to her OB. Pediatric care will be with the clinic. Juan Alcantara has a pack and play and car seat and is already involved with [facility] and WIC. Juan Alcantara  denied any past or current history of children services involvement, legal issues, substance abuse aside from [facility], domestic violence or mental health diagnosis. CANDELARIA completed a [facility] ( [phone]) referral to Rebecca Lynn in intake.      PLAN    Baby can NOT be discharged home until [facility] ( [phone]) provides disposition  SW to continue communication with nursing staff and [facility] ( [phone])     Electronically signed by VIPIN Valencia on 1/5/2022 at 12:12 PM

## 2022-01-05 NOTE — LACTATION NOTE
Pt is an experienced breast feeder that denies question or concerns. Baby goes to breast at regular intervals and pt states baby does well. Encouraged frequent feeds at breast, hand expression and skin to skin to establish supply. Support provided and encouraged to call with any needs. Communication via  [phone].

## 2022-01-05 NOTE — PROGRESS NOTES
SUBJECTIVE:  Patient without complaint. Reports she is not in any pain. Voiding and passing flatus. Tolerating regular diet. Ambulating in room and up to chair. Breast feeding, experienced Mom. Minimal lochia, denies any clots. Covid positive on 2021: denies cough, shortness of breath, chest pain, n/v, loss of taste or smell, fatigue, fever, chills. Utilized  Ever # [id number]. Denies any emotional concerns. OBJECTIVE:  /64   Pulse 64   Temp 98.7 °F (37.1 °C) (Oral)   Resp 16   Ht 5' 1\" (1.549 m)   Wt 187 lb (84.8 kg)   SpO2 98%   Breastfeeding Yes   BMI 35.33 kg/m²       Recent Labs     22  0525 22  0400   WBC 9.1  --    RBC 3.96  --    HGB 11.5 10.5*   HCT 34.9 32.6*   MCV 88.1  --    MCH 29.0  --    MCHC 33.0  --    RDW 13.8  --      --    MPV 10.9  --      Physical Exam:   General: comfortable, holding infant  Cor: RRR  Pulm: CTA b/l  Abdomen soft, appropriately tender. (+) BS x 4 quadrants.    Incision: Mepilex dressin silver dollar  sized old blood stains at both edges of dressing, intact   Uterus firm at umbilicus   Lochia rubra scant  Extremities: (-) edema, (-) calf tenderness       ASSESSMENT/PLAN: 31 yo  36w5d s/p repeat (4th) LTCS due to history of uterine rupture in previous pregnancy at 38 weeks, 3 previous  sections, Covid positive 2021- asymptomatic  Postoperative Day #1  Advance care  Will follow      Masood Morrell PA-C 2022 8:49 AM

## 2022-01-05 NOTE — CARE COORDINATION
SW Discharge Planning     [facility] ( [phone]) supervisor, Wai Diaz, [facility] ( [phone]) will NOT be involved at this time     PLAN    Baby CAN be discharged home when medically ready, children services will NOT be involved at this time.      Electronically signed by VIPIN Spencer on 1/5/2022 at 12:27 PM

## 2022-01-05 NOTE — LACTATION NOTE
Baby placed under bili lights. Mother request EBP to bedside to stimulate milk production. Use, assembly, and cleaning reviewed with pt. Pumping initiated with s.o. present and informed along side pt.

## 2022-01-05 NOTE — PLAN OF CARE
Problem: Breathing Pattern - Ineffective  Goal: Ability to achieve and maintain a regular respiratory rate  Outcome: Met This Shift     Problem: Isolation Precautions - Risk of Spread of Infection  Goal: Prevent transmission of infection  Outcome: Met This Shift     Problem: Risk for Fluid Volume Deficit  Goal: Maintain normal heart rhythm  Outcome: Met This Shift     Problem: Fatigue  Goal: Verbalize increase energy and improved vitality  Outcome: Met This Shift     Problem: Pain:  Goal: Pain level will decrease  Description: Pain level will decrease  Outcome: Met This Shift

## 2022-01-05 NOTE — PROGRESS NOTES
[facility] [place] screening results were discussed with parent. Questions answered. Brochure given to parent. Advised to monitor developmental milestones and contact physician for any concerns.    Jazmin Rivera      USED

## 2022-01-06 VITALS
BODY MASS INDEX: 35.3 KG/M2 | HEIGHT: 61 IN | OXYGEN SATURATION: 98 % | TEMPERATURE: 98.7 F | WEIGHT: 187 LBS | HEART RATE: 93 BPM | DIASTOLIC BLOOD PRESSURE: 68 MMHG | SYSTOLIC BLOOD PRESSURE: 103 MMHG | RESPIRATION RATE: 16 BRPM

## 2022-01-06 PROCEDURE — 6370000000 HC RX 637 (ALT 250 FOR IP): Performed by: OBSTETRICS & GYNECOLOGY

## 2022-01-06 PROCEDURE — 90471 IMMUNIZATION ADMIN: CPT | Performed by: OBSTETRICS & GYNECOLOGY

## 2022-01-06 PROCEDURE — 6360000002 HC RX W HCPCS: Performed by: OBSTETRICS & GYNECOLOGY

## 2022-01-06 PROCEDURE — 90707 MMR VACCINE SC: CPT | Performed by: OBSTETRICS & GYNECOLOGY

## 2022-01-06 PROCEDURE — 99238 HOSP IP/OBS DSCHRG MGMT 30/<: CPT | Performed by: PHYSICIAN ASSISTANT

## 2022-01-06 RX ORDER — DOCUSATE SODIUM 100 MG/1
100 CAPSULE, LIQUID FILLED ORAL 2 TIMES DAILY
Qty: 20 CAPSULE | Refills: 0 | Status: SHIPPED | OUTPATIENT
Start: 2022-01-06

## 2022-01-06 RX ORDER — HYDROCODONE BITARTRATE AND ACETAMINOPHEN 5; 325 MG/1; MG/1
1 TABLET ORAL EVERY 4 HOURS PRN
Qty: 12 TABLET | Refills: 0 | Status: SHIPPED | OUTPATIENT
Start: 2022-01-06 | End: 2022-01-09

## 2022-01-06 RX ORDER — IBUPROFEN 800 MG/1
800 TABLET ORAL EVERY 8 HOURS
Qty: 30 TABLET | Refills: 0 | Status: SHIPPED | OUTPATIENT
Start: 2022-01-06

## 2022-01-06 RX ADMIN — IBUPROFEN 800 MG: 800 TABLET, FILM COATED ORAL at 05:43

## 2022-01-06 RX ADMIN — MEASLES, MUMPS, AND RUBELLA VIRUS VACCINE LIVE 0.5 ML: 1000; 12500; 1000 INJECTION, POWDER, LYOPHILIZED, FOR SUSPENSION SUBCUTANEOUS at 15:16

## 2022-01-06 RX ADMIN — METFORMIN HYDROCHLORIDE 1 TABLET: 500 TABLET, EXTENDED RELEASE ORAL at 09:00

## 2022-01-06 RX ADMIN — IBUPROFEN 800 MG: 800 TABLET, FILM COATED ORAL at 16:08

## 2022-01-06 RX ADMIN — Medication: at 09:00

## 2022-01-06 RX ADMIN — DOCUSATE SODIUM 100 MG: 100 CAPSULE ORAL at 09:00

## 2022-01-06 RX ADMIN — SIMETHICONE 80 MG: 80 TABLET, CHEWABLE ORAL at 09:00

## 2022-01-06 ASSESSMENT — PAIN SCALES - GENERAL
PAINLEVEL_OUTOF10: 2
PAINLEVEL_OUTOF10: 3

## 2022-01-06 NOTE — PROGRESS NOTES
SUBJECTIVE:  Patient without complaint. Voiding. (+) flatus. (-) BM. Breast & bottle feeding. Experienced Mom. Minimal lochia, denies clots. Covid positive on 2021: denies cough, shortness of breath, chest pain, n/v, loss of taste or smell, fatigue, fever, chills. Utilized  Feng Saldaña # [id number]. Denies any emotional concerns    OBJECTIVE:  /64   Pulse 77   Temp 98.2 °F (36.78 °C) (Oral)   Resp 14   Ht 5' 1\" (1.549 m)   Wt 187 lb (84.8 kg)   SpO2 97%   BMI 35.33 kg/m²      Recent Labs     22  0525 22  0400   WBC 9.1  --    RBC 3.96  --    HGB 11.5 10.5*   HCT 34.9 32.6*   MCV 88.1  --    MCH 29.0  --    MCHC 33.0  --    RDW 13.8  --      --    MPV 10.9  --      Physical Exam:  General: comfortable  Cor: RRR  Pulm: CTA b/l  Abdomen soft, appropriately tender. (+) BS x 4 quadrants. Incision: Mepilex dressing: unchanged from yesterday, 2 silver dollar sized old blood stains at both edges of dressing, intact   Uterus firm 1 cm below umbilicus, nontender   Lochia scant rubra  Extremities: (-) edema, (-) calf tenderness    ASSESSMENT/PLAN: 33 yo female  36w5d s/p repeat (4th) LTCS 2022 at 07:39 due to history of uterine rupture in previous pregnancy at 38 weeks, 3 previous  sections, Covid positive 2021- asymptomatic  Postoperative Day #2  Advance care  Anticipate discharge tomorrow  Scripts for Norco, Motrin, and Colace sent to [address] [place] [address],[address], PAANGIE 2022 8:25 AM     Addendum:   Diego Villa discharged this afternoon. Mom would like to go home. Ok for discharge today  Discharge precautions given per RN with assistance of  Juan Pablo Lama # [id number]. Patient advised to call and schedule a follow-up appointment at the [facility] for next week, sooner if needed.     Autumn Portillo PA-C

## 2022-01-06 NOTE — PROGRESS NOTES
Discharge teaching done with the assistance of [address] [place] [address] # [id number]. Mom verbalizes understanding. Aware or need for MMR.

## 2022-01-06 NOTE — PROGRESS NOTES
Assessment as charted. Mom comfortable. Love Aguilar PA-C in room.  Lindsay Luke #[id number] used for assessment concerns, questions, explanations. REINALDOB @ bedside.

## 2022-01-06 NOTE — LACTATION NOTE
Pt denies concerns surrounding baby latching. She is concerned about milk production. She has a history of good milk production and no risk factors for lactation failure. Pt is encouraged to offer breast frequently and decrease use of formula. Pt verbalized understanding.

## 2022-01-06 NOTE — DISCHARGE SUMMARY
Obstetrical Discharge Form    Patient Name: Namita Kwok    YOB: 1995    Medical Record Number: [id number]    Primary OB Clinician: Daren Young MD: Mis Campoverde, DO    Postpartum/post-operative Day # 2    Gestational Age at time of delivery: 36 weeks 5 days    Admission Date/Date of Delivery: 2022    Delivery Type: scheduled repeat  section, low transverse incision    Baby: Liveborn female, delivered 2022 at 07:39 am weighing 3040 grams with Apgar scores of 9 and 9    Intrapartum complications: 3 previous  sections, history of uterine rupture in previous pregnancy at 38 weeks, Covid positive 2021 - asymptomatic on admission and throughout hospital stay, [address] speaking patient    Feeding method: both breast and bottle - Similac Advance    Discharge Date: 2022    Condition: both Mom and infant discharged in stable condition  Plan:     Follow-up appointment next week at [facility] for dressing removal and incision check ad at 6 weeks post-op/postpartum. She was advised to call tomorrow to schedule that appointment. Post-op/postpartum discharge precautions were given. Please refer to chart for further information. Scripts for Standard [place], Motrin, and Colace. Resume PNV.       Tyrese Carlson PA-C

## 2022-01-06 NOTE — PROGRESS NOTES
CLINICAL PHARMACY NOTE: MEDS TO BEDS    Total # of Prescriptions Filled: 3   The following medications were delivered to the patient:  · norco 5/325  · Ibuprofen 800  · Docusate 100    Additional Documentation:  Signed by SHEREE Piña

## 2022-01-07 NOTE — ADT AUTH CERT
PATIENT DISCHARGED ON 01/06/22. PER [facility] WEBSITE SHOWS PATIENT DISCHARGED ON 01/05/22 AND THAT IS INCORRECT. THANK YOU.

## 2022-01-12 ENCOUNTER — POSTPARTUM VISIT (OUTPATIENT)
Dept: OBGYN | Age: 27
End: 2022-01-12
Payer: MEDICAID

## 2022-01-12 VITALS
HEIGHT: 63 IN | BODY MASS INDEX: 31.54 KG/M2 | RESPIRATION RATE: 16 BRPM | SYSTOLIC BLOOD PRESSURE: 122 MMHG | DIASTOLIC BLOOD PRESSURE: 76 MMHG | HEART RATE: 80 BPM | WEIGHT: 178 LBS

## 2022-01-12 DIAGNOSIS — Z78.9 NON-ENGLISH SPEAKING PATIENT: ICD-10-CM

## 2022-01-12 DIAGNOSIS — Z09 POSTOP CHECK: ICD-10-CM

## 2022-01-12 PROCEDURE — G8417 CALC BMI ABV UP PARAM F/U: HCPCS | Performed by: OBSTETRICS & GYNECOLOGY

## 2022-01-12 PROCEDURE — 1036F TOBACCO NON-USER: CPT | Performed by: OBSTETRICS & GYNECOLOGY

## 2022-01-12 PROCEDURE — 1111F DSCHRG MED/CURRENT MED MERGE: CPT | Performed by: OBSTETRICS & GYNECOLOGY

## 2022-01-12 PROCEDURE — G8482 FLU IMMUNIZE ORDER/ADMIN: HCPCS | Performed by: OBSTETRICS & GYNECOLOGY

## 2022-01-12 PROCEDURE — 99213 OFFICE O/P EST LOW 20 MIN: CPT | Performed by: OBSTETRICS & GYNECOLOGY

## 2022-01-12 PROCEDURE — G8427 DOCREV CUR MEDS BY ELIG CLIN: HCPCS | Performed by: OBSTETRICS & GYNECOLOGY

## 2022-01-12 PROCEDURE — 99024 POSTOP FOLLOW-UP VISIT: CPT | Performed by: OBSTETRICS & GYNECOLOGY

## 2022-01-12 NOTE — PROGRESS NOTES
Here for postpartum check as well as Post Op check from her LTCS last week. Was Covid Positive 12/30/2021. Doing well today. Manuela here to translate today. Depression screening was negative. Bowels are fine. Abdomen:  Healing well. Vlean, dry, intact. IMP: Normal PP course, normal PO exam    PLAN: RTC 5 weeks, instructions given to the patient.

## 2022-01-12 NOTE — PROGRESS NOTES
Postpartum visit today C-Sec  Incision clean and dry healing well. Return in 5 weeks for pelvic exam   here for entire visit.

## 2022-02-28 ENCOUNTER — POSTPARTUM VISIT (OUTPATIENT)
Dept: OBGYN | Age: 27
End: 2022-02-28
Payer: MEDICAID

## 2022-02-28 VITALS
DIASTOLIC BLOOD PRESSURE: 58 MMHG | BODY MASS INDEX: 31.35 KG/M2 | HEART RATE: 91 BPM | WEIGHT: 177 LBS | SYSTOLIC BLOOD PRESSURE: 100 MMHG

## 2022-02-28 DIAGNOSIS — Z78.9 NON-ENGLISH SPEAKING PATIENT: ICD-10-CM

## 2022-02-28 DIAGNOSIS — Z09 POSTOP CHECK: ICD-10-CM

## 2022-02-28 PROCEDURE — G8417 CALC BMI ABV UP PARAM F/U: HCPCS | Performed by: OBSTETRICS & GYNECOLOGY

## 2022-02-28 PROCEDURE — 1036F TOBACCO NON-USER: CPT | Performed by: OBSTETRICS & GYNECOLOGY

## 2022-02-28 PROCEDURE — G8482 FLU IMMUNIZE ORDER/ADMIN: HCPCS | Performed by: OBSTETRICS & GYNECOLOGY

## 2022-02-28 PROCEDURE — G8427 DOCREV CUR MEDS BY ELIG CLIN: HCPCS | Performed by: OBSTETRICS & GYNECOLOGY

## 2022-02-28 PROCEDURE — 99213 OFFICE O/P EST LOW 20 MIN: CPT | Performed by: OBSTETRICS & GYNECOLOGY

## 2022-02-28 NOTE — PROGRESS NOTES
Nw here for 6 weeks postpartum ans PO cesaearn visit. Doing well. Depression screen fine. Denies any current problems. Baby doing well. Will need to be back for a pap in 2 months please. Now back to full activity. Jana Brothers here to translate today. Abdomen: wellhealed and clear    Pelvic:  Cervix healed and normal   Uterues is firn and well involuted.     IMP: Normal Postpartum and po  exam.    Plan: pap in 2 months

## 2022-10-16 ENCOUNTER — HOSPITAL ENCOUNTER (EMERGENCY)
Age: 27
Discharge: HOME | End: 2022-10-16
Attending: EMERGENCY MEDICINE
Payer: MEDICAID

## 2022-10-16 ENCOUNTER — APPOINTMENT (OUTPATIENT)
Dept: ULTRASOUND IMAGING | Age: 27
End: 2022-10-16
Payer: MEDICAID

## 2022-10-16 VITALS
BODY MASS INDEX: 33.61 KG/M2 | WEIGHT: 178 LBS | RESPIRATION RATE: 16 BRPM | SYSTOLIC BLOOD PRESSURE: 115 MMHG | HEIGHT: 61 IN | DIASTOLIC BLOOD PRESSURE: 80 MMHG | TEMPERATURE: 97.6 F | OXYGEN SATURATION: 98 % | HEART RATE: 93 BPM

## 2022-10-16 DIAGNOSIS — O46.90 VAGINAL BLEEDING IN PREGNANCY: Primary | ICD-10-CM

## 2022-10-16 DIAGNOSIS — O20.0 THREATENED MISCARRIAGE: ICD-10-CM

## 2022-10-16 LAB
ALBUMIN SERPL-MCNC: 4.3 G/DL (ref 3.5–5.2)
ALP BLD-CCNC: 107 U/L (ref 35–104)
ALT SERPL-CCNC: 14 U/L (ref 0–32)
ANION GAP SERPL CALCULATED.3IONS-SCNC: 9 MMOL/L (ref 7–16)
AST SERPL-CCNC: 14 U/L (ref 0–31)
BACTERIA: ABNORMAL /HPF
BASOPHILS ABSOLUTE: 0.02 E9/L (ref 0–0.2)
BASOPHILS RELATIVE PERCENT: 0.2 % (ref 0–2)
BILIRUB SERPL-MCNC: <0.2 MG/DL (ref 0–1.2)
BILIRUBIN URINE: NEGATIVE
BLOOD, URINE: ABNORMAL
BUN BLDV-MCNC: 10 MG/DL (ref 6–20)
CALCIUM SERPL-MCNC: 9.4 MG/DL (ref 8.6–10.2)
CHLORIDE BLD-SCNC: 104 MMOL/L (ref 98–107)
CLARITY: CLEAR
CO2: 25 MMOL/L (ref 22–29)
COLOR: YELLOW
CREAT SERPL-MCNC: 0.7 MG/DL (ref 0.5–1)
EOSINOPHILS ABSOLUTE: 0.06 E9/L (ref 0.05–0.5)
EOSINOPHILS RELATIVE PERCENT: 0.7 % (ref 0–6)
GFR AFRICAN AMERICAN: >60
GFR NON-AFRICAN AMERICAN: >60 ML/MIN/1.73
GLUCOSE BLD-MCNC: 90 MG/DL (ref 74–99)
GLUCOSE URINE: NEGATIVE MG/DL
GONADOTROPIN, CHORIONIC (HCG) QUANT: 701.3 MIU/ML
HCG, URINE, POC: POSITIVE
HCT VFR BLD CALC: 37.6 % (ref 34–48)
HEMOGLOBIN: 12.2 G/DL (ref 11.5–15.5)
IMMATURE GRANULOCYTES #: 0.03 E9/L
IMMATURE GRANULOCYTES %: 0.3 % (ref 0–5)
KETONES, URINE: ABNORMAL MG/DL
LEUKOCYTE ESTERASE, URINE: NEGATIVE
LYMPHOCYTES ABSOLUTE: 1.56 E9/L (ref 1.5–4)
LYMPHOCYTES RELATIVE PERCENT: 17.2 % (ref 20–42)
Lab: ABNORMAL
MCH RBC QN AUTO: 29.6 PG (ref 26–35)
MCHC RBC AUTO-ENTMCNC: 32.4 % (ref 32–34.5)
MCV RBC AUTO: 91.3 FL (ref 80–99.9)
MONOCYTES ABSOLUTE: 0.56 E9/L (ref 0.1–0.95)
MONOCYTES RELATIVE PERCENT: 6.2 % (ref 2–12)
NEGATIVE QC PASS/FAIL: ABNORMAL
NEUTROPHILS ABSOLUTE: 6.86 E9/L (ref 1.8–7.3)
NEUTROPHILS RELATIVE PERCENT: 75.4 % (ref 43–80)
NITRITE, URINE: NEGATIVE
PDW BLD-RTO: 12.9 FL (ref 11.5–15)
PH UA: 8 (ref 5–9)
PLATELET # BLD: 326 E9/L (ref 130–450)
PMV BLD AUTO: 10.4 FL (ref 7–12)
POSITIVE QC PASS/FAIL: ABNORMAL
POTASSIUM REFLEX MAGNESIUM: 3.6 MMOL/L (ref 3.5–5)
PROTEIN UA: NEGATIVE MG/DL
RBC # BLD: 4.12 E12/L (ref 3.5–5.5)
RBC UA: ABNORMAL /HPF (ref 0–2)
SODIUM BLD-SCNC: 138 MMOL/L (ref 132–146)
SPECIFIC GRAVITY UA: 1.02 (ref 1–1.03)
TOTAL PROTEIN: 7.6 G/DL (ref 6.4–8.3)
UROBILINOGEN, URINE: 1 E.U./DL
WBC # BLD: 9.1 E9/L (ref 4.5–11.5)
WBC UA: ABNORMAL /HPF (ref 0–5)

## 2022-10-16 PROCEDURE — 76817 TRANSVAGINAL US OBSTETRIC: CPT

## 2022-10-16 PROCEDURE — 81001 URINALYSIS AUTO W/SCOPE: CPT

## 2022-10-16 PROCEDURE — 99283 EMERGENCY DEPT VISIT LOW MDM: CPT

## 2022-10-16 PROCEDURE — 76801 OB US < 14 WKS SINGLE FETUS: CPT

## 2022-10-16 PROCEDURE — 87088 URINE BACTERIA CULTURE: CPT

## 2022-10-16 PROCEDURE — 80053 COMPREHEN METABOLIC PANEL: CPT

## 2022-10-16 PROCEDURE — 84702 CHORIONIC GONADOTROPIN TEST: CPT

## 2022-10-16 PROCEDURE — 85025 COMPLETE CBC W/AUTO DIFF WBC: CPT

## 2022-10-16 ASSESSMENT — ENCOUNTER SYMPTOMS
CONSTIPATION: 0
COLOR CHANGE: 0
CHEST TIGHTNESS: 0
ABDOMINAL PAIN: 1
EYE REDNESS: 0
SHORTNESS OF BREATH: 0
NAUSEA: 0
DIARRHEA: 0
BLOOD IN STOOL: 0
VOMITING: 0

## 2022-10-16 NOTE — ED NOTES
Department of Emergency Medicine    FIRST PROVIDER TRIAGE NOTE             Independent MLP           10/16/22  4:10 PM EDT    Date of Encounter: 10/16/22   MRN: [id number]    Vitals:    10/16/22 1610   Pulse: 93   Resp: 16   Temp: 97.6 °F (36.4 °C)   TempSrc: Oral   SpO2: 98%   Weight: 178 lb (80.7 kg)   Height: 5' 1\" (1.549 m)      HPI: Miky Marie is a 32 y.o. female who presents to the ED for Vaginal Bleeding (7 wks preg, vaginal bleeding)     Blood Type: O POSITIVE     4th pregnancy. OBGYN: Dr. Peter Souza     ROS: Negative for cp, sob, fever, or urinary complaints. Physical Exam:   Gen Appearance/Constitutional: alert  CV: regular rate     Initial Plan of Care: All treatment areas with department are currently occupied. Plan to order/Initiate the following while awaiting opening in ED: labs.     Initial Plan of Care: Initiate Treatment-Testing, Proceed toTreatment Area When Bed Available for ED Attending/MLP to Continue Care    Electronically signed by Rafat Cano PA-C   DD: 10/16/22       Rafat Cano PA-C  10/16/22 9015

## 2022-10-16 NOTE — ED PROVIDER NOTES
[facility] ENCOUNTER      Pt Name: Yecenia Bravo  MRN: [id number]  [place] 1995  Date of evaluation: 10/16/2022      CHIEF COMPLAINT       Chief Complaint   Patient presents with    Vaginal Bleeding     7 wks preg, vaginal bleeding        HPI  Yecenia Bravo is a 32 y.o. female  without significant PMHx who presents with chief complaint of vaginal bleeding. Patient states that vaginal bleeding started today along with abdominal cramping that feels like her menstrual period. She states that she is 7 weeks pregnant and that her last menstrual period was around the end of August.  She has not seen an obstetrician yet during this pregnancy but states that he has an appointment scheduled for the beginning of November. She denies any lightheadedness or dizziness, no visual disturbance, no chest pain or pressure, no shortness of breath. She has a history of 3 previous pregnancies with live births via  section and all 3 pregnancies. Most recent childbirth was 9 months ago per patient. Review of Systems   Constitutional:  Negative for chills and fever. HENT:  Negative for nosebleeds. Eyes:  Negative for redness and visual disturbance. Respiratory:  Negative for chest tightness and shortness of breath. Cardiovascular:  Negative for chest pain and palpitations. Gastrointestinal:  Positive for abdominal pain. Negative for blood in stool, constipation, diarrhea, nausea and vomiting. Endocrine: Negative for cold intolerance and heat intolerance. Genitourinary:  Positive for pelvic pain and vaginal bleeding. Negative for difficulty urinating, dysuria and frequency. Musculoskeletal:  Negative for gait problem and neck pain. Skin:  Negative for color change and rash. Neurological:  Negative for dizziness, weakness and light-headedness. Physical Exam  Vitals reviewed.    Constitutional:       General: She is not in acute distress. Appearance: Normal appearance. HENT:      Head: Normocephalic and atraumatic. Right Ear: External ear normal.      Left Ear: External ear normal.      Nose: Nose normal.      Mouth/Throat:      Mouth: Mucous membranes are moist.   Eyes:      Extraocular Movements: Extraocular movements intact. Conjunctiva/sclera: Conjunctivae normal.   Cardiovascular:      Rate and Rhythm: Normal rate and regular rhythm. Pulses:           Posterior tibial pulses are 2+ on the right side and 2+ on the left side. Heart sounds: Normal heart sounds. No murmur heard. No friction rub. No gallop. Pulmonary:      Effort: Pulmonary effort is normal.      Breath sounds: Normal breath sounds. No wheezing or rales. Abdominal:      Palpations: Abdomen is soft. There is no mass. Tenderness: There is abdominal tenderness (bilateral lower quadrants). There is no right CVA tenderness, left CVA tenderness, guarding or rebound. Hernia: No hernia is present. Musculoskeletal:         General: Normal range of motion. Cervical back: Normal range of motion and neck supple. Skin:     General: Skin is warm and dry. Neurological:      General: No focal deficit present. Mental Status: She is alert. Psychiatric:         Mood and Affect: Mood normal.        Procedures     ANAHI Mayberry presented with chief complaint of vaginal bleeding. Point-of-care urine pregnancy testing was positive. Quantitative hCG was 701. CBC and CMP are unremarkable. Urinalysis did not show evidence of urinary tract infection. Transvaginal ultrasound did not identify intrauterine pregnancy findings are most concerning for miscarriage in process. Patient blood type is O+ and does not require RhoGAM administration. Discussed these findings with the patient and the patient demonstrated understanding.   Did discuss with patient the need to follow-up with OB/GYN, Dr. Miguel Gutierrez, for further evaluation of full evacuation of products of conception. Did discuss red flag symptoms or findings which would require return to the emergency department for further evaluation. Patient demonstrated understanding. Of note, patient is Iranian-speaking and though she was accompanied by a friend who speaks both [place] and Iranian and  was used throughout the patient encounter to communicate with the patient.           --------------------------------------------- PAST HISTORY ---------------------------------------------  Past Medical History:  has a past medical history of Nausea and vomiting in pregnancy. Past Surgical History:  has a past surgical history that includes  section and  section (N/A, 2022). Social History:  reports that she has never smoked. She has never used smokeless tobacco. She reports that she does not currently use alcohol. She reports that she does not currently use drugs. Family History: family history is not on file. The patients home medications have been reviewed. Allergies: Patient has no known allergies.     -------------------------------------------------- RESULTS -------------------------------------------------  Labs:  Results for orders placed or performed during the hospital encounter of 10/16/22   CBC with Auto Differential   Result Value Ref Range    WBC 9.1 4.5 - 11.5 E9/L    RBC 4.12 3.50 - 5.50 E12/L    Hemoglobin 12.2 11.5 - 15.5 g/dL    Hematocrit 37.6 34.0 - 48.0 %    MCV 91.3 80.0 - 99.9 fL    MCH 29.6 26.0 - 35.0 pg    MCHC 32.4 32.0 - 34.5 %    RDW 12.9 11.5 - 15.0 fL    Platelets 331 462 - 446 E9/L    MPV 10.4 7.0 - 12.0 fL    Neutrophils % 75.4 43.0 - 80.0 %    Immature Granulocytes % 0.3 0.0 - 5.0 %    Lymphocytes % 17.2 (L) 20.0 - 42.0 %    Monocytes % 6.2 2.0 - 12.0 %    Eosinophils % 0.7 0.0 - 6.0 %    Basophils % 0.2 0.0 - 2.0 %    Neutrophils Absolute 6.86 1.80 - 7.30 E9/L    Immature Granulocytes # 0.03 E9/L    Lymphocytes Absolute 1.56 1.50 - 4.00 E9/L    Monocytes Absolute 0.56 0.10 - 0.95 E9/L    Eosinophils Absolute 0.06 0.05 - 0.50 E9/L    Basophils Absolute 0.02 0.00 - 0.20 E9/L   Comprehensive Metabolic Panel w/ Reflex to MG   Result Value Ref Range    Sodium 138 132 - 146 mmol/L    Potassium reflex Magnesium 3.6 3.5 - 5.0 mmol/L    Chloride 104 98 - 107 mmol/L    CO2 25 22 - 29 mmol/L    Anion Gap 9 7 - 16 mmol/L    Glucose 90 74 - 99 mg/dL    BUN 10 6 - 20 mg/dL    Creatinine 0.7 0.5 - 1.0 mg/dL    GFR Non-African American >60 >=60 mL/min/1.73    GFR African American >60     Calcium 9.4 8.6 - 10.2 mg/dL    Total Protein 7.6 6.4 - 8.3 g/dL    Albumin 4.3 3.5 - 5.2 g/dL    Total Bilirubin <0.2 0.0 - 1.2 mg/dL    Alkaline Phosphatase 107 (H) 35 - 104 U/L    ALT 14 0 - 32 U/L    AST 14 0 - 31 U/L   hCG, quantitative, pregnancy   Result Value Ref Range    hCG Quant 701.3 (H) <10 mIU/mL   Urinalysis   Result Value Ref Range    Color, UA Yellow Straw/Yellow    Clarity, UA Clear Clear    Glucose, Ur Negative Negative mg/dL    Bilirubin Urine Negative Negative    Ketones, Urine TRACE (A) Negative mg/dL    Specific Gravity, UA 1.020 1.005 - 1.030    Blood, Urine LARGE (A) Negative    pH, UA 8.0 5.0 - 9.0    Protein, UA Negative Negative mg/dL    Urobilinogen, Urine 1.0 <2.0 E.U./dL    Nitrite, Urine Negative Negative    Leukocyte Esterase, Urine Negative Negative   Microscopic Urinalysis   Result Value Ref Range    WBC, UA NONE 0 - 5 /HPF    RBC, UA 10-20 (A) 0 - 2 /HPF    Bacteria, UA NONE SEEN None Seen /HPF   POC Pregnancy Urine Qual   Result Value Ref Range    HCG, Urine, POC Positive Negative    Lot Number [id number]     Positive QC Pass/Fail Pass     Negative QC Pass/Fail Pass        Radiology:  US OB TRANSVAGINAL   Final Result   No intrauterine pregnancy identified. Blood products within the endometrial   canal and open cervix highly suggestive of miscarriage in progress.    Recommend correlation with serial beta HCG levels and repeat pelvic   ultrasound, if clinically indicated. US OB LESS THAN 14 WEEKS SINGLE OR FIRST GESTATION   Final Result   No intrauterine pregnancy identified. Blood products within the endometrial   canal and open cervix highly suggestive of miscarriage in progress. Recommend correlation with serial beta HCG levels and repeat pelvic   ultrasound, if clinically indicated. ------------------------- NURSING NOTES AND VITALS REVIEWED ---------------------------  Date / Time Roomed:  10/16/2022  4:19 PM  ED Bed Assignment:  04/04    The nursing notes within the ED encounter and vital signs as below have been reviewed. /80   Pulse 93   Temp 97.6 °F (36.4 °C) (Oral)   Resp 16   Ht 5' 1\" (1.549 m)   Wt 178 lb (80.7 kg)   SpO2 98%   BMI 33.63 kg/m²   Oxygen Saturation Interpretation: Normal      ------------------------------------------ PROGRESS NOTES ------------------------------------------  2:09 AM EDT  I have spoken with the patient and family if present and discussed todays results, in addition to providing specific details for the plan of care and counseling regarding the diagnosis and prognosis. Their questions are answered at this time and they are agreeable with the plan. I discussed at length with them reasons for immediate return here for re evaluation. They will followup with [facility], Dr. Taniya Hein, by calling their office as soon as possible.      --------------------------------- ADDITIONAL PROVIDER NOTES ---------------------------------  At this time the patient is without objective evidence of an acute process requiring hospitalization or inpatient management. They have remained hemodynamically stable throughout their entire ED visit and are stable for discharge with outpatient follow-up. The plan has been discussed in detail and they are aware of the specific conditions for emergent return, as well as the importance of follow-up.       Discharge Medication List as

## 2022-10-19 LAB — URINE CULTURE, ROUTINE: NORMAL

## 2023-03-09 ENCOUNTER — INITIAL PRENATAL (OUTPATIENT)
Dept: OBGYN | Age: 28
End: 2023-03-09

## 2023-03-09 VITALS
SYSTOLIC BLOOD PRESSURE: 96 MMHG | WEIGHT: 181.8 LBS | DIASTOLIC BLOOD PRESSURE: 52 MMHG | BODY MASS INDEX: 34.35 KG/M2 | HEART RATE: 86 BPM

## 2023-03-09 DIAGNOSIS — N91.2 AMENORRHEA: Primary | ICD-10-CM

## 2023-03-09 DIAGNOSIS — Z87.828 HISTORY OF RUPTURE OF UTERUS: ICD-10-CM

## 2023-03-09 DIAGNOSIS — Z98.891 PREVIOUS CESAREAN SECTION: ICD-10-CM

## 2023-03-09 DIAGNOSIS — Z3A.14 14 WEEKS GESTATION OF PREGNANCY: ICD-10-CM

## 2023-03-09 LAB
CONTROL: NORMAL
GLUCOSE URINE, POC: NEGATIVE
PREGNANCY TEST URINE, POC: POSITIVE
PROTEIN UA: NEGATIVE
SOURCE: NORMAL

## 2023-03-09 RX ORDER — ONDANSETRON 4 MG/1
4 TABLET, ORALLY DISINTEGRATING ORAL 3 TIMES DAILY PRN
Qty: 21 TABLET | Refills: 0 | Status: SHIPPED | OUTPATIENT
Start: 2023-03-09

## 2023-03-09 RX ORDER — PNV NO.95/FERROUS FUM/FOLIC AC 28MG-0.8MG
1 TABLET ORAL DAILY
Qty: 60 TABLET | Refills: 3 | Status: SHIPPED | OUTPATIENT
Start: 2023-03-09

## 2023-03-09 NOTE — PROGRESS NOTES
Used  Ipad line to introduce patient to Centering Pregnancy. Patient voiced interest in Centering. I will schedule patient for CP appointments. I gave patient instructions to set up [facility] and showed instructions discharge summary. Patient voiced understanding.

## 2023-03-09 NOTE — PROGRESS NOTES
Subjective:      Luann Arteaga is being seen today for her first obstetrical visit. She is at 15 and 3/7 weeks gestation  Her obstetrical history is significant for uterine rupture at 38 weeks with fetal demise, prior CS x 4. FOB present    ER visit at 7 weeks for vaginal bleeding, no additional episodes since that time     No concerns today   No chronic medications  No genetic disorders  Had pap last year in [facility], records unavailable     Objective:     Vitals:    03/09/23 1106   BP: (!) 96/52   Pulse: 86      General Appearance:    Alert, cooperative, no distress, appears stated age  Head:    Normocephalic, without obvious abnormality, atraumatic  Lungs:     Respirations unlabored  Heart:    Regular rate and rhythm  Breast Exam:  Deferred  Abdomen:     Soft, non-tender, bowel sounds active all four quadrants,     no masses, no organomegaly  Genitalia:    Normal appearing vulva and vagina without lesion, discharge or tenderness. Normal cervix. Assessment:      Pregnancy at 14 and 3/7 weeks   Prior CS x 4  Hx of uterine rupture at 38 weeks with fetal demise     Plan:        Initial labs drawn. Panorama sent   Neg CF and SMA screens last pregnancy  Prenatal vitamins.   Referred to [facility] for US, hx of CS x 4, and uterine rupture   Tylenol prn HA  Zofran prn nausea  Will need CS consult   Follow up 4 weeks

## 2023-03-09 NOTE — PROGRESS NOTES
Patient alert and pleasant with no complaints. Here today for new prenatal visit. Fetal heart tones obtained without difficulty. [facility]  #[id number], Nancy Poster available via IPAD for interpretation. Urine for glucose and protein obtained with negative results, then labeled and sent to the lab for Urine Culture. Assisted with pelvic exam, pap smear and GCC obtained, labeled and hand delivered to lab. Discharge instructions have been discussed with the patient. Patient advised to call our office with any questions or concerns. Voiced understanding.

## 2023-03-11 LAB — URINE CULTURE, ROUTINE: NORMAL

## (undated) DEVICE — SHEET,DRAPE,53X77,STERILE: Brand: MEDLINE

## (undated) DEVICE — GLOVE SURG SZ 6 THK91MIL LTX FREE SYN POLYISOPRENE ANTI

## (undated) DEVICE — APPLICATOR PREP 26ML 0.7% IOD POVACRYLEX 74% ISO ALC ST

## (undated) DEVICE — DRESSING ANTIMIC FOAM MEPILEX BORD POSTOP AG PROD SZ 4X12 IN

## (undated) DEVICE — SUTURE 0 L36IN ABSRB BRN CT L40MM 1/2 CIR TAPERPOINT SGL 914H

## (undated) DEVICE — CATHETERIZATION KIT FOL16 FR 2000 CC DRAINAGE BG LUBRICATH

## (undated) DEVICE — PENCIL ES L3M BTTN SWCH HOLSTER W/ BLDE ELECTRD EDGE

## (undated) DEVICE — BLADE SURG NO20 S STL STR DISP GLASSVAN

## (undated) DEVICE — SUTURE STRATAFIX SYMMETRIC SZ 1 L18IN ABSRB VLT CT1 L36CM SXPP1A404

## (undated) DEVICE — PEN: MARKING STD 100/CS: Brand: MEDICAL ACTION INDUSTRIES

## (undated) DEVICE — GLOVE SURG SZ 65 L12IN FNGR THK83MIL CRM POLYISOPRENE

## (undated) DEVICE — SPONGE LAP W18XL18IN WHT COT 4 PLY FLD STRUNG RADPQ DISP ST

## (undated) DEVICE — Device: Brand: PORTEX

## (undated) DEVICE — ELECTRODE PT RET AD L9FT HI MOIST COND ADH HYDRGEL CORDED

## (undated) DEVICE — CONTAINER SPEC 64OZ POLYPR PATH SNAP LOK CAP W/ LID

## (undated) DEVICE — TOWEL,OR,DSP,ST,BLUE,STD,6/PK,12PK/CS: Brand: MEDLINE

## (undated) DEVICE — GOWN,SIRUS,FABRNF,L,20/CS: Brand: MEDLINE

## (undated) DEVICE — CONTAINER,SPEC,PNEUM TUBE,3OZ,STRL PATH: Brand: MEDLINE

## (undated) DEVICE — MEDI-VAC YANKAUER SUCTION HANDLE W/BULBOUS TIP: Brand: CARDINAL HEALTH

## (undated) DEVICE — CESAREAN BIRTH PACK II: Brand: MEDLINE INDUSTRIES, INC.

## (undated) DEVICE — COVER,LIGHT HANDLE,FLX,2/PK: Brand: MEDLINE INDUSTRIES, INC.

## (undated) DEVICE — TUBE BLD COLLECT ST 1 SIL COAT 7ML 10ML

## (undated) DEVICE — COUNTER NDL 30 COUNT DBL MAG

## (undated) DEVICE — TUBING, SUCTION, 3/16" X 12', STRAIGHT: Brand: MEDLINE

## (undated) DEVICE — SUTURE VCRL + SZ 4-0 L27IN ABSRB UD PS-2 3/8 CIR REV CUT VCP426H

## (undated) DEVICE — HYPODERMIC SAFETY NEEDLE: Brand: MAGELLAN

## (undated) DEVICE — 3000CC GUARDIAN II: Brand: GUARDIAN